# Patient Record
Sex: MALE | Race: WHITE | NOT HISPANIC OR LATINO | Employment: FULL TIME | URBAN - METROPOLITAN AREA
[De-identification: names, ages, dates, MRNs, and addresses within clinical notes are randomized per-mention and may not be internally consistent; named-entity substitution may affect disease eponyms.]

---

## 2017-01-16 RX ORDER — ATENOLOL 50 MG/1
TABLET ORAL
Qty: 90 TABLET | Refills: 0 | Status: SHIPPED | OUTPATIENT
Start: 2017-01-16 | End: 2017-03-03

## 2017-03-03 ENCOUNTER — OFFICE VISIT (OUTPATIENT)
Dept: INTERNAL MEDICINE | Facility: CLINIC | Age: 27
End: 2017-03-03

## 2017-03-03 VITALS
HEIGHT: 70 IN | BODY MASS INDEX: 38.34 KG/M2 | DIASTOLIC BLOOD PRESSURE: 76 MMHG | HEART RATE: 76 BPM | WEIGHT: 267.8 LBS | SYSTOLIC BLOOD PRESSURE: 114 MMHG | OXYGEN SATURATION: 98 % | TEMPERATURE: 98 F | RESPIRATION RATE: 16 BRPM

## 2017-03-03 DIAGNOSIS — Z00.00 ENCOUNTER FOR PREVENTIVE HEALTH EXAMINATION: Primary | ICD-10-CM

## 2017-03-03 PROCEDURE — 99395 PREV VISIT EST AGE 18-39: CPT | Performed by: INTERNAL MEDICINE

## 2017-03-03 NOTE — PROGRESS NOTES
Subjective   Primitivo Reeves is a 26 y.o. male.     Chief Complaint   Patient presents with   • Hypertension   • ADD   • Anxiety         HPI Comments: In for annual preventative exam.  Sleeps 7-8 hours per night.  Sleep is been improving.  Exercises one day per week.  Energy is satisfactory.  Diet is balanced.    Hypertension   This is a chronic problem. The current episode started more than 1 year ago. The problem is unchanged. The problem is controlled. Pertinent negatives include no chest pain, headaches, palpitations, peripheral edema or shortness of breath. Risk factors for coronary artery disease include male gender and smoking/tobacco exposure. Past treatments include beta blockers. The current treatment provides significant improvement. There are no compliance problems.  There is no history of angina, kidney disease, CAD/MI, CVA or heart failure.        The following portions of the patient's history were reviewed and updated as appropriate: allergies, current medications, past social history and problem list.    HISTORY  Outpatient Prescriptions Marked as Taking for the 3/3/17 encounter (Office Visit) with Chicho Mckeon MD   Medication Sig Dispense Refill   • atenolol (TENORMIN) 50 MG tablet TAKE 1 TABLET BY MOUTH DAILY 90 tablet 0     Social History     Social History   • Marital status:      Spouse name: N/A   • Number of children: N/A   • Years of education: N/A     Occupational History   • Not on file.     Social History Main Topics   • Smoking status: Former Smoker     Quit date: 2014   • Smokeless tobacco: Not on file   • Alcohol use 9.0 oz/week     15 Cans of beer per week   • Drug use: Not on file   • Sexual activity: Not on file     Other Topics Concern   • Not on file     Social History Narrative     Family History   Problem Relation Age of Onset   • No Known Problems Mother    • No Known Problems Brother    • No Known Problems Son      Past Medical History   Diagnosis Date   • ADD  (attention deficit disorder)    • Anxiety, generalized    • AR (allergic rhinitis)    • GERD (gastroesophageal reflux disease)    • Hypertension      Past Surgical History   Procedure Laterality Date   • Pilonidal cystectomy         Review of Systems   Constitutional: Negative for chills, fatigue and fever.   HENT: Negative for congestion, ear pain, nosebleeds, rhinorrhea, sore throat and voice change.    Eyes: Negative for discharge, itching and visual disturbance.   Respiratory: Negative for cough, chest tightness, shortness of breath and wheezing.    Cardiovascular: Negative for chest pain, palpitations and leg swelling.   Gastrointestinal: Negative for abdominal pain, anal bleeding, constipation, diarrhea, nausea and vomiting.   Endocrine: Negative for cold intolerance, heat intolerance and polyuria.   Genitourinary: Negative for difficulty urinating, dysuria, frequency, hematuria and urgency.   Musculoskeletal: Negative for arthralgias, back pain and myalgias.   Skin: Negative for rash and wound.   Allergic/Immunologic: Negative for environmental allergies.   Neurological: Negative for dizziness, syncope, weakness, light-headedness and headaches.   Hematological: Negative for adenopathy. Does not bruise/bleed easily.   Psychiatric/Behavioral: Negative for confusion and sleep disturbance. The patient is nervous/anxious.        Objective   Vitals:    03/03/17 1411   BP: 114/76   Pulse: 76   Resp: 16   Temp: 98 °F (36.7 °C)   SpO2: 98%      Last Weight    03/03/17  1411   Weight: 267 lb 12.8 oz (121 kg)    [unfilled]  Body mass index is 38.43 kg/(m^2).      Physical Exam   Constitutional: He is oriented to person, place, and time. He appears well-developed and well-nourished.   HENT:   Head: Normocephalic and atraumatic.   Right Ear: External ear normal.   Left Ear: External ear normal.   Nose: Nose normal.   Mouth/Throat: Oropharynx is clear and moist.   Eyes: Conjunctivae and EOM are normal. Pupils are  equal, round, and reactive to light. No scleral icterus.   Neck: Normal range of motion. Neck supple. No JVD present. No thyromegaly present.   Cardiovascular: Normal rate, regular rhythm, normal heart sounds and intact distal pulses.  Exam reveals no gallop and no friction rub.    No murmur heard.  Pulmonary/Chest: Effort normal and breath sounds normal. No respiratory distress. He has no wheezes. He has no rales.   Abdominal: Soft. Bowel sounds are normal. He exhibits no distension and no mass. There is no tenderness. There is no rebound and no guarding. No hernia.   Musculoskeletal: Normal range of motion. He exhibits no edema.   Lymphadenopathy:     He has no cervical adenopathy.   Neurological: He is alert and oriented to person, place, and time. He has normal reflexes. He displays normal reflexes.   Skin: Skin is warm and dry.   Psychiatric: He has a normal mood and affect. His behavior is normal. Judgment and thought content normal.   Nursing note and vitals reviewed.        Problem List Items Addressed This Visit     None      Visit Diagnoses     Encounter for preventive health examination    -  Primary        Assessment/Plan   In for annual preventative exam.  Blood pressure is doing great.  Well-controlled.  He is off of ADD medicine.  He's had some significant anxiety and panic in the past.  That's doing better.  He had his annual lab work in August.  We'll continue to monitor blood pressure at 6 month intervals.  He is otherwise up-to-date and doing well.  He could lose some weight.  Could stand exercise more.         Dragon disclaimer:   Much of this encounter note is an electronic transcription/translation of spoken language to printed text. The electronic translation of spoken language may permit erroneous, or at times, nonsensical words or phrases to be inadvertently transcribed; Although I have reviewed the note for such errors, some may still exist.

## 2017-04-14 RX ORDER — ATENOLOL 50 MG/1
TABLET ORAL
Qty: 90 TABLET | Refills: 1 | Status: SHIPPED | OUTPATIENT
Start: 2017-04-14 | End: 2017-09-29 | Stop reason: ALTCHOICE

## 2017-09-14 ENCOUNTER — TELEPHONE (OUTPATIENT)
Dept: INTERNAL MEDICINE | Facility: CLINIC | Age: 27
End: 2017-09-14

## 2017-09-14 RX ORDER — METOPROLOL SUCCINATE 50 MG/1
50 TABLET, EXTENDED RELEASE ORAL DAILY
Qty: 90 TABLET | Refills: 1 | Status: SHIPPED | OUTPATIENT
Start: 2017-09-14 | End: 2017-09-29 | Stop reason: SDUPTHER

## 2017-09-29 ENCOUNTER — TELEPHONE (OUTPATIENT)
Dept: INTERNAL MEDICINE | Facility: CLINIC | Age: 27
End: 2017-09-29

## 2017-09-29 RX ORDER — METOPROLOL SUCCINATE 50 MG/1
50 TABLET, EXTENDED RELEASE ORAL DAILY
Qty: 30 TABLET | Refills: 2 | Status: SHIPPED | OUTPATIENT
Start: 2017-09-29 | End: 2017-09-29 | Stop reason: SDUPTHER

## 2017-09-29 RX ORDER — METOPROLOL SUCCINATE 50 MG/1
50 TABLET, EXTENDED RELEASE ORAL DAILY
Qty: 30 TABLET | Refills: 2 | Status: SHIPPED | OUTPATIENT
Start: 2017-09-29 | End: 2018-03-27 | Stop reason: ALTCHOICE

## 2017-09-29 NOTE — TELEPHONE ENCOUNTER
This is another patient who is on Atenolol. It is still on back order and unavailable to patients. What would be comparable? Please advise.     Switch him to metoprolol succinate 50 mg once daily.  #30.  2 refills.

## 2018-01-02 ENCOUNTER — OFFICE VISIT (OUTPATIENT)
Dept: INTERNAL MEDICINE | Facility: CLINIC | Age: 28
End: 2018-01-02

## 2018-01-02 VITALS
TEMPERATURE: 98.6 F | WEIGHT: 272 LBS | HEART RATE: 112 BPM | DIASTOLIC BLOOD PRESSURE: 98 MMHG | BODY MASS INDEX: 38.94 KG/M2 | HEIGHT: 70 IN | SYSTOLIC BLOOD PRESSURE: 150 MMHG | RESPIRATION RATE: 16 BRPM | OXYGEN SATURATION: 96 %

## 2018-01-02 DIAGNOSIS — J40 BRONCHITIS: Primary | ICD-10-CM

## 2018-01-02 PROCEDURE — 99213 OFFICE O/P EST LOW 20 MIN: CPT | Performed by: INTERNAL MEDICINE

## 2018-01-02 RX ORDER — BENZONATATE 200 MG/1
200 CAPSULE ORAL 3 TIMES DAILY PRN
Qty: 30 CAPSULE | Refills: 0 | Status: SHIPPED | OUTPATIENT
Start: 2018-01-02 | End: 2018-03-27

## 2018-01-02 RX ORDER — AMOXICILLIN AND CLAVULANATE POTASSIUM 875; 125 MG/1; MG/1
1 TABLET, FILM COATED ORAL EVERY 12 HOURS SCHEDULED
Qty: 20 TABLET | Refills: 0 | Status: SHIPPED | OUTPATIENT
Start: 2018-01-02 | End: 2018-03-27

## 2018-01-02 NOTE — PROGRESS NOTES
Subjective   Primitivo Reeves is a 27 y.o. male.     Chief Complaint   Patient presents with   • Cough     w phlemb x 6 wks   • Nasal Congestion     x 4-5 days         Cough   This is a new problem. The current episode started 1 to 4 weeks ago. The problem has been unchanged. The problem occurs constantly. The cough is productive of sputum. Associated symptoms include postnasal drip. Pertinent negatives include no chills, fever, nasal congestion, rhinorrhea, sore throat or shortness of breath. Nothing aggravates the symptoms. He has tried OTC cough suppressant for the symptoms. The treatment provided mild relief. There is no history of asthma, COPD or emphysema.        The following portions of the patient's history were reviewed and updated as appropriate: allergies, current medications, past social history and problem list.    Outpatient Prescriptions Marked as Taking for the 1/2/18 encounter (Office Visit) with Chicho Mckeon MD   Medication Sig Dispense Refill   • Dextromethorphan-Guaifenesin (DELSYM [facility]/CHEST REINA DM CHILD) 5-100 MG/5ML liquid Take  by mouth.     • metoprolol succinate XL (TOPROL XL) 50 MG 24 hr tablet Take 1 tablet by mouth Daily. 30 tablet 2       Review of Systems   Constitutional: Negative for chills and fever.   HENT: Positive for postnasal drip. Negative for rhinorrhea and sore throat.    Respiratory: Positive for cough. Negative for shortness of breath.        Objective   Vitals:    01/02/18 1457   BP: 150/98   Pulse: 112   Resp: 16   Temp: 98.6 °F (37 °C)   SpO2: 96%      Last Weight    01/02/18  1457   Weight: 123 kg (272 lb)    [unfilled]  Body mass index is 39.03 kg/(m^2).      Physical Exam   Constitutional: He appears well-developed and well-nourished.   HENT:   Head: Normocephalic and atraumatic.   Right Ear: External ear normal.   Left Ear: External ear normal.   Nose: Nose normal.   Mouth/Throat: Oropharynx is clear and moist.   Eyes: Conjunctivae are normal. Pupils are equal,  round, and reactive to light.   Pulmonary/Chest: Effort normal and breath sounds normal. No respiratory distress. He has no wheezes. He has no rales.   Skin: Skin is warm and dry.         Problem List Items Addressed This Visit     None      Visit Diagnoses     Bronchitis    -  Primary    Relevant Medications    Dextromethorphan-Guaifenesin (DELSYM [facility]/CHEST REINA DM CHILD) 5-100 MG/5ML liquid        Assessment/Plan   Prolonged cough.  He's been coughing for one month.  Bringing up a little sputum.  Not too much.  Not much else in the way of symptoms.  He is not clearing up.  He's tried over-the-counter cough syrup with only modest success.  We'll add some Augmentin 875 twice a day for 10 days.  Tessalon cough perles when necessary.         Dragon disclaimer:   Much of this encounter note is an electronic transcription/translation of spoken language to printed text. The electronic translation of spoken language may permit erroneous, or at times, nonsensical words or phrases to be inadvertently transcribed; Although I have reviewed the note for such errors, some may still exist.

## 2018-01-08 NOTE — TELEPHONE ENCOUNTER
----- Message from Suzanne Savage MA sent at 1/8/2018 11:15 AM EST -----  Regarding: FW: Prescription Question  Contact: [phone]      ----- Message -----     From: Primitivo Reeves     Sent: 1/8/2018  11:10 AM       To: Y[facility] [place]  Subject: RE: Prescription Question                        I am still taking that     ----- Message -----  From: AICHA MAYERS  Sent: 1/8/18, 10:42 AM  To: Primitivo Reeves  Subject: RE: Prescription Question    Primitivo,  I will get a message back to Dr. Bella regarding the cough medicine. Are you still taking your antibiotic as well?     ----- Message -----     From: Primitivo Reeves     Sent: 1/8/2018 10:41 AM EST       To: Chicho Bella MD  Subject: Prescription Question    Hello, I was wondering if maybe dr bella could give me a stronger cough medicine per my recent visit. The benzonatate isnt helping any more than otc delsym. Thanks!      Okay to start on some H and B1 to 1 when necessary.  1-2 teaspoons every 4 hours when necessary cough.  Did not take the Tessalon cough perles while he is taking the cough syrup.

## 2018-01-18 ENCOUNTER — TELEPHONE (OUTPATIENT)
Dept: INTERNAL MEDICINE | Facility: CLINIC | Age: 28
End: 2018-01-18

## 2018-01-18 NOTE — TELEPHONE ENCOUNTER
Patient was in the office 1-2-18 with bronchitis. He says his cough is still lingering around. He also woke up this morning with a lot of pain in his back. He thinks he may have pulled a muscle from coughing. Please advise.     Try on a Medrol Dosepak.  It may help with the cough and the back pain.

## 2018-01-25 RX ORDER — ATENOLOL 50 MG/1
TABLET ORAL
Qty: 90 TABLET | Refills: 0 | Status: SHIPPED | OUTPATIENT
Start: 2018-01-25 | End: 2018-03-23 | Stop reason: SDUPTHER

## 2018-03-23 RX ORDER — ATENOLOL 50 MG/1
50 TABLET ORAL DAILY
Qty: 90 TABLET | Refills: 0 | Status: SHIPPED | OUTPATIENT
Start: 2018-03-23 | End: 2018-04-05 | Stop reason: SDUPTHER

## 2018-03-27 ENCOUNTER — OFFICE VISIT (OUTPATIENT)
Dept: INTERNAL MEDICINE | Facility: CLINIC | Age: 28
End: 2018-03-27

## 2018-03-27 VITALS
HEIGHT: 70 IN | HEART RATE: 70 BPM | RESPIRATION RATE: 16 BRPM | SYSTOLIC BLOOD PRESSURE: 130 MMHG | TEMPERATURE: 98.5 F | OXYGEN SATURATION: 98 % | BODY MASS INDEX: 40.2 KG/M2 | WEIGHT: 280.8 LBS | DIASTOLIC BLOOD PRESSURE: 92 MMHG

## 2018-03-27 DIAGNOSIS — Z00.00 ENCOUNTER FOR ANNUAL HEALTH EXAMINATION: Primary | ICD-10-CM

## 2018-03-27 DIAGNOSIS — I10 ESSENTIAL HYPERTENSION: ICD-10-CM

## 2018-03-27 LAB
ALBUMIN SERPL-MCNC: 4.7 G/DL (ref 3.5–5.2)
ALBUMIN/GLOB SERPL: 1.6 G/DL
ALP SERPL-CCNC: 68 U/L (ref 39–117)
ALT SERPL-CCNC: 32 U/L (ref 1–41)
AST SERPL-CCNC: 22 U/L (ref 1–40)
BASOPHILS # BLD AUTO: 0.03 10*3/MM3 (ref 0–0.2)
BASOPHILS NFR BLD AUTO: 0.3 % (ref 0–1.5)
BILIRUB BLD-MCNC: ABNORMAL MG/DL
BILIRUB SERPL-MCNC: 0.7 MG/DL (ref 0.1–1.2)
BUN SERPL-MCNC: 12 MG/DL (ref 6–20)
BUN/CREAT SERPL: 13.6 (ref 7–25)
CALCIUM SERPL-MCNC: 10 MG/DL (ref 8.6–10.5)
CHLORIDE SERPL-SCNC: 96 MMOL/L (ref 98–107)
CHOLEST SERPL-MCNC: 200 MG/DL (ref 0–200)
CLARITY, POC: CLEAR
CO2 SERPL-SCNC: 24.1 MMOL/L (ref 22–29)
COLOR UR: ABNORMAL
CREAT SERPL-MCNC: 0.88 MG/DL (ref 0.76–1.27)
EOSINOPHIL # BLD AUTO: 0.1 10*3/MM3 (ref 0–0.7)
EOSINOPHIL NFR BLD AUTO: 1.1 % (ref 0.3–6.2)
ERYTHROCYTE [DISTWIDTH] IN BLOOD BY AUTOMATED COUNT: 12.9 % (ref 11.5–14.5)
GFR SERPLBLD CREATININE-BSD FMLA CKD-EPI: 102 ML/MIN/1.73
GFR SERPLBLD CREATININE-BSD FMLA CKD-EPI: 124 ML/MIN/1.73
GLOBULIN SER CALC-MCNC: 2.9 GM/DL
GLUCOSE SERPL-MCNC: 87 MG/DL (ref 65–99)
GLUCOSE UR STRIP-MCNC: NEGATIVE MG/DL
HCT VFR BLD AUTO: 46 % (ref 40.4–52.2)
HDLC SERPL-MCNC: 46 MG/DL (ref 40–60)
HGB BLD-MCNC: 15.6 G/DL (ref 13.7–17.6)
IMM GRANULOCYTES # BLD: 0.05 10*3/MM3 (ref 0–0.03)
IMM GRANULOCYTES NFR BLD: 0.6 % (ref 0–0.5)
KETONES UR QL: ABNORMAL
LDLC SERPL CALC-MCNC: 112 MG/DL (ref 0–100)
LEUKOCYTE EST, POC: NEGATIVE
LYMPHOCYTES # BLD AUTO: 3.23 10*3/MM3 (ref 0.9–4.8)
LYMPHOCYTES NFR BLD AUTO: 36.5 % (ref 19.6–45.3)
MCH RBC QN AUTO: 32.3 PG (ref 27–32.7)
MCHC RBC AUTO-ENTMCNC: 33.9 G/DL (ref 32.6–36.4)
MCV RBC AUTO: 95.2 FL (ref 79.8–96.2)
MONOCYTES # BLD AUTO: 0.8 10*3/MM3 (ref 0.2–1.2)
MONOCYTES NFR BLD AUTO: 9 % (ref 5–12)
NEUTROPHILS # BLD AUTO: 4.64 10*3/MM3 (ref 1.9–8.1)
NEUTROPHILS NFR BLD AUTO: 52.5 % (ref 42.7–76)
NITRITE UR-MCNC: NEGATIVE MG/ML
PH UR: 6.5 [PH] (ref 5–8)
PLATELET # BLD AUTO: 280 10*3/MM3 (ref 140–500)
POTASSIUM SERPL-SCNC: 4.2 MMOL/L (ref 3.5–5.2)
PROT SERPL-MCNC: 7.6 G/DL (ref 6–8.5)
PROT UR STRIP-MCNC: ABNORMAL MG/DL
RBC # BLD AUTO: 4.83 10*6/MM3 (ref 4.6–6)
RBC # UR STRIP: NEGATIVE /UL
SODIUM SERPL-SCNC: 137 MMOL/L (ref 136–145)
SP GR UR: 1.02 (ref 1–1.03)
TRIGL SERPL-MCNC: 208 MG/DL (ref 0–150)
UROBILINOGEN UR QL: ABNORMAL
VLDLC SERPL CALC-MCNC: 41.6 MG/DL (ref 5–40)
WBC # BLD AUTO: 8.85 10*3/MM3 (ref 4.5–10.7)

## 2018-03-27 PROCEDURE — 81003 URINALYSIS AUTO W/O SCOPE: CPT | Performed by: INTERNAL MEDICINE

## 2018-03-27 PROCEDURE — 99395 PREV VISIT EST AGE 18-39: CPT | Performed by: INTERNAL MEDICINE

## 2018-03-27 NOTE — PROGRESS NOTES
Subjective   Primitivo Reeves is a 27 y.o. male.     Chief Complaint   Patient presents with   • Annual Exam         In for annual preventative exam.  Sleeps 6-8 hours per night.  Sleep is been improving.  Exercises 3-4 days per week.  Energy is satisfactory.  Diet is balanced.         The following portions of the patient's history were reviewed and updated as appropriate: allergies, current medications, past social history and problem list.    HISTORY  Outpatient Prescriptions Marked as Taking for the 3/27/18 encounter (Office Visit) with Chicho Mckeon MD   Medication Sig Dispense Refill   • atenolol (TENORMIN) 50 MG tablet TAKE 1 TABLET BY MOUTH DAILY 90 tablet 0     Social History     Social History   • Marital status:      Spouse name: N/A   • Number of children: N/A   • Years of education: N/A     Occupational History   • Not on file.     Social History Main Topics   • Smoking status: Former Smoker     Packs/day: 1.00     Years: 3.00     Types: Cigarettes     Start date: 2011     Quit date: 2014   • Smokeless tobacco: Current User     Types: Chew     Last attempt to quit: 2017   • Alcohol use 9.0 oz/week     15 Cans of beer per week   • Drug use: Unknown   • Sexual activity: Not on file     Other Topics Concern   • Not on file     Social History Narrative   • No narrative on file     Family History   Problem Relation Age of Onset   • No Known Problems Mother    • No Known Problems Brother    • No Known Problems Son      Past Medical History:   Diagnosis Date   • ADD (attention deficit disorder)    • Anxiety, generalized    • AR (allergic rhinitis)    • GERD (gastroesophageal reflux disease)    • Hypertension      Past Surgical History:   Procedure Laterality Date   • PILONIDAL CYSTECTOMY         Review of Systems   Constitutional: Negative for chills, fatigue and fever.   HENT: Negative for congestion, ear pain, nosebleeds, rhinorrhea, sore throat and voice change.    Eyes: Negative for discharge,  itching and visual disturbance.   Respiratory: Negative for cough, chest tightness and wheezing.    Cardiovascular: Negative for leg swelling.   Gastrointestinal: Negative for abdominal pain, anal bleeding, constipation, diarrhea, nausea and vomiting.   Endocrine: Negative for cold intolerance, heat intolerance and polyuria.   Genitourinary: Negative for difficulty urinating, dysuria, frequency, hematuria and urgency.   Musculoskeletal: Negative for arthralgias, back pain and myalgias.   Skin: Negative for rash and wound.   Allergic/Immunologic: Positive for environmental allergies.   Neurological: Negative for dizziness, syncope, weakness and light-headedness.   Hematological: Negative for adenopathy. Does not bruise/bleed easily.   Psychiatric/Behavioral: Negative for confusion and sleep disturbance. The patient is nervous/anxious.        Objective   Vitals:    03/27/18 1346   BP: 130/92   Pulse: 70   Resp: 16   Temp: 98.5 °F (36.9 °C)   SpO2: 98%      1    03/27/18  1346   Weight: 127 kg (280 lb 12.8 oz)    [unfilled]  Body mass index is 40.29 kg/m².      Physical Exam   Constitutional: He is oriented to person, place, and time. He appears well-developed and well-nourished.   HENT:   Head: Normocephalic and atraumatic.   Right Ear: External ear normal.   Left Ear: External ear normal.   Nose: Nose normal.   Mouth/Throat: Oropharynx is clear and moist.   Eyes: Conjunctivae and EOM are normal. Pupils are equal, round, and reactive to light. No scleral icterus.   Neck: Normal range of motion. Neck supple. No JVD present. No thyromegaly present.   Cardiovascular: Normal rate, regular rhythm, normal heart sounds and intact distal pulses.  Exam reveals no gallop and no friction rub.    No murmur heard.  Pulmonary/Chest: Effort normal and breath sounds normal. No respiratory distress. He has no wheezes. He has no rales.   Abdominal: Soft. Bowel sounds are normal. He exhibits no distension and no mass. There is no  tenderness. There is no rebound and no guarding. No hernia.   Musculoskeletal: Normal range of motion. He exhibits no edema.   Lymphadenopathy:     He has no cervical adenopathy.   Neurological: He is alert and oriented to person, place, and time. He has normal reflexes. He displays normal reflexes.   Skin: Skin is warm and dry.   Psychiatric: He has a normal mood and affect. His behavior is normal. Judgment and thought content normal.   Nursing note and vitals reviewed.        Problem List Items Addressed This Visit     None      Visit Diagnoses     Encounter for annual health examination    -  Primary    Relevant Orders    POCT urinalysis dipstick, automated (Completed)        Assessment/Plan   In for annual preventative exam.  Blood pressure is up today.  Previously well-controlled.  He is off of ADD medicine.  He's had some significant anxiety and panic in the past.  That's doing better.  He will get his annual lab work today, 3/2018.  We'll continue to monitor blood pressure at 6 month intervals.  He is otherwise up-to-date and doing well.  He could lose some weight.  Could stand exercise more.         Dragon disclaimer:   Much of this encounter note is an electronic transcription/translation of spoken language to printed text. The electronic translation of spoken language may permit erroneous, or at times, nonsensical words or phrases to be inadvertently transcribed; Although I have reviewed the note for such errors, some may still exist.

## 2018-04-05 RX ORDER — ATENOLOL 50 MG/1
50 TABLET ORAL DAILY
Qty: 90 TABLET | Refills: 1 | Status: SHIPPED | OUTPATIENT
Start: 2018-04-05 | End: 2018-09-28 | Stop reason: SDUPTHER

## 2018-07-06 RX ORDER — ATENOLOL 50 MG/1
50 TABLET ORAL DAILY
Qty: 90 TABLET | Refills: 0 | Status: SHIPPED | OUTPATIENT
Start: 2018-07-06 | End: 2019-05-08 | Stop reason: SDUPTHER

## 2018-09-28 ENCOUNTER — OFFICE VISIT (OUTPATIENT)
Dept: INTERNAL MEDICINE | Facility: CLINIC | Age: 28
End: 2018-09-28

## 2018-09-28 VITALS
TEMPERATURE: 98.9 F | DIASTOLIC BLOOD PRESSURE: 84 MMHG | OXYGEN SATURATION: 96 % | HEIGHT: 70 IN | WEIGHT: 256.8 LBS | RESPIRATION RATE: 16 BRPM | SYSTOLIC BLOOD PRESSURE: 148 MMHG | BODY MASS INDEX: 36.76 KG/M2 | HEART RATE: 66 BPM

## 2018-09-28 DIAGNOSIS — F41.9 ANXIETY: ICD-10-CM

## 2018-09-28 DIAGNOSIS — I10 ESSENTIAL HYPERTENSION: Primary | ICD-10-CM

## 2018-09-28 DIAGNOSIS — K21.9 GASTROESOPHAGEAL REFLUX DISEASE WITHOUT ESOPHAGITIS: ICD-10-CM

## 2018-09-28 PROCEDURE — 99213 OFFICE O/P EST LOW 20 MIN: CPT | Performed by: INTERNAL MEDICINE

## 2018-09-28 RX ORDER — OLMESARTAN MEDOXOMIL 20 MG/1
10 TABLET ORAL DAILY
Qty: 45 TABLET | Refills: 1 | Status: SHIPPED | OUTPATIENT
Start: 2018-09-28 | End: 2019-03-24 | Stop reason: SDUPTHER

## 2018-09-28 NOTE — PATIENT INSTRUCTIONS
Exercising to Stay Healthy  Exercising regularly is important. It has many health benefits, such as:  · Improving your overall fitness, flexibility, and endurance.  · Increasing your bone density.  · Helping with weight control.  · Decreasing your body fat.  · Increasing your muscle strength.  · Reducing stress and tension.  · Improving your overall health.    In order to become healthy and stay healthy, it is recommended that you do moderate-intensity and vigorous-intensity exercise. You can tell that you are exercising at a moderate intensity if you have a higher heart rate and faster breathing, but you are still able to hold a conversation. You can tell that you are exercising at a vigorous intensity if you are breathing much harder and faster and cannot hold a conversation while exercising.  How often should I exercise?  Choose an activity that you enjoy and set realistic goals. Your health care provider can help you to make an activity plan that works for you. Exercise regularly as directed by your health care provider. This may include:  · Doing resistance training twice each week, such as:  ? Push-ups.  ? Sit-ups.  ? Lifting weights.  ? Using resistance bands.  · Doing a given intensity of exercise for a given amount of time. Choose from these options:  ? 150 minutes of moderate-intensity exercise every week.  ? 75 minutes of vigorous-intensity exercise every week.  ? A mix of moderate-intensity and vigorous-intensity exercise every week.    Children, pregnant women, people who are out of shape, people who are overweight, and older adults may need to consult a health care provider for individual recommendations. If you have any sort of medical condition, be sure to consult your health care provider before starting a new exercise program.  What are some exercise ideas?  Some moderate-intensity exercise ideas include:  · Walking at a rate of 1 mile in 15  minutes.  · Biking.  · Hiking.  · Golfing.  · Dancing.    Some vigorous-intensity exercise ideas include:  · Walking at a rate of at least 4.5 miles per hour.  · Jogging or running at a rate of 5 miles per hour.  · Biking at a rate of at least 10 miles per hour.  · Lap swimming.  · Roller-skating or in-line skating.  · Cross-country skiing.  · Vigorous competitive sports, such as football, basketball, and soccer.  · Jumping rope.  · Aerobic dancing.    What are some everyday activities that can help me to get exercise?  · Yard work, such as:  ? Pushing a .  ? Raking and bagging leaves.  · Washing and waxing your car.  · Pushing a stroller.  · Shoveling snow.  · Gardening.  · Washing windows or floors.  How can I be more active in my day-to-day activities?  · Use the stairs instead of the elevator.  · Take a walk during your lunch break.  · If you drive, park your car farther away from work or school.  · If you take public transportation, get off one stop early and walk the rest of the way.  · Make all of your phone calls while standing up and walking around.  · Get up, stretch, and walk around every 30 minutes throughout the day.  What guidelines should I follow while exercising?  · Do not exercise so much that you hurt yourself, feel dizzy, or get very short of breath.  · Consult your health care provider before starting a new exercise program.  · Wear comfortable clothes and shoes with good support.  · Drink plenty of water while you exercise to prevent dehydration or heat stroke. Body water is lost during exercise and must be replaced.  · Work out until you breathe faster and your heart beats faster.  This information is not intended to replace advice given to you by your health care provider. Make sure you discuss any questions you have with your health care provider.  Document Released: 01/20/2012 Document Revised: 05/25/2017 Document Reviewed: 05/21/2015  [employer] Interactive Patient Education © 2018  [employer]  Calorie Counting for Weight Loss  Calories are units of energy. Your body needs a certain amount of calories from food to keep you going throughout the day. When you eat more calories than your body needs, your body stores the extra calories as fat. When you eat fewer calories than your body needs, your body burns fat to get the energy it needs.  Calorie counting means keeping track of how many calories you eat and drink each day. Calorie counting can be helpful if you need to lose weight. If you make sure to eat fewer calories than your body needs, you should lose weight. Ask your health care provider what a healthy weight is for you.  For calorie counting to work, you will need to eat the right number of calories in a day in order to lose a healthy amount of weight per week. A dietitian can help you determine how many calories you need in a day and will give you suggestions on how to reach your calorie goal.  · A healthy amount of weight to lose per week is usually 1-2 lb (0.5-0.9 kg). This usually means that your daily calorie intake should be reduced by 500-750 calories.  · Eating 1,200 - 1,500 calories per day can help most women lose weight.  · Eating 1,500 - 1,800 calories per day can help most men lose weight.    What do I need to know about calorie counting?  In order to meet your daily calorie goal, you will need to:  · Find out how many calories are in each food you would like to eat. Try to do this before you eat.  · Decide how much of the food you plan to eat.  · Write down what you ate and how many calories it had. Doing this is called keeping a food log.    To successfully lose weight, it is important to balance calorie counting with a healthy lifestyle that includes regular activity. Aim for 150 minutes of moderate exercise (such as walking) or 75 minutes of vigorous exercise (such as running) each week.  Where do I find calorie information?    The number of calories in a food can be  found on a Nutrition Facts label. If a food does not have a Nutrition Facts label, try to look up the calories online or ask your dietitian for help.  Remember that calories are listed per serving. If you choose to have more than one serving of a food, you will have to multiply the calories per serving by the amount of servings you plan to eat. For example, the label on a package of bread might say that a serving size is 1 slice and that there are 90 calories in a serving. If you eat 1 slice, you will have eaten 90 calories. If you eat 2 slices, you will have eaten 180 calories.  How do I keep a food log?  Immediately after each meal, record the following information in your food log:  · What you ate. Don't forget to include toppings, sauces, and other extras on the food.  · How much you ate. This can be measured in cups, ounces, or number of items.  · How many calories each food and drink had.  · The total number of calories in the meal.    Keep your food log near you, such as in a small notebook in your pocket, or use a mobile mary ellen or website. Some programs will calculate calories for you and show you how many calories you have left for the day to meet your goal.  What are some calorie counting tips?  · Use your calories on foods and drinks that will fill you up and not leave you hungry:  ? Some examples of foods that fill you up are nuts and nut butters, vegetables, lean proteins, and high-fiber foods like whole grains. High-fiber foods are foods with more than 5 g fiber per serving.  ? Drinks such as sodas, specialty coffee drinks, alcohol, and juices have a lot of calories, yet do not fill you up.  · Eat nutritious foods and avoid empty calories. Empty calories are calories you get from foods or beverages that do not have many vitamins or protein, such as candy, sweets, and soda. It is better to have a nutritious high-calorie food (such as an avocado) than a food with few nutrients (such as a bag of  "chips).  · Know how many calories are in the foods you eat most often. This will help you calculate calorie counts faster.  · Pay attention to calories in drinks. Low-calorie drinks include water and unsweetened drinks.  · Pay attention to nutrition labels for \"low fat\" or \"fat free\" foods. These foods sometimes have the same amount of calories or more calories than the full fat versions. They also often have added sugar, starch, or salt, to make up for flavor that was removed with the fat.  · Find a way of tracking calories that works for you. Get creative. Try different apps or programs if writing down calories does not work for you.  What are some portion control tips?  · Know how many calories are in a serving. This will help you know how many servings of a certain food you can have.  · Use a measuring cup to measure serving sizes. You could also try weighing out portions on a kitchen scale. With time, you will be able to estimate serving sizes for some foods.  · Take some time to put servings of different foods on your favorite plates, bowls, and cups so you know what a serving looks like.  · Try not to eat straight from a bag or box. Doing this can lead to overeating. Put the amount you would like to eat in a cup or on a plate to make sure you are eating the right portion.  · Use smaller plates, glasses, and bowls to prevent overeating.  · Try not to multitask (for example, watch TV or use your computer) while eating. If it is time to eat, sit down at a table and enjoy your food. This will help you to know when you are full. It will also help you to be aware of what you are eating and how much you are eating.  What are tips for following this plan?  Reading food labels  · Check the calorie count compared to the serving size. The serving size may be smaller than what you are used to eating.  · Check the source of the calories. Make sure the food you are eating is high in vitamins and protein and low in " saturated and trans fats.  Shopping  · Read nutrition labels while you shop. This will help you make healthy decisions before you decide to purchase your food.  · Make a grocery list and stick to it.  Cooking  · Try to cook your favorite foods in a healthier way. For example, try baking instead of frying.  · Use low-fat dairy products.  Meal planning  · Use more fruits and vegetables. Half of your plate should be fruits and vegetables.  · Include lean proteins like poultry and fish.  How do I count calories when eating out?  · Ask for smaller portion sizes.  · Consider sharing an entree and sides instead of getting your own entree.  · If you get your own entree, eat only half. Ask for a box at the beginning of your meal and put the rest of your entree in it so you are not tempted to eat it.  · If calories are listed on the menu, choose the lower calorie options.  · Choose dishes that include vegetables, fruits, whole grains, low-fat dairy products, and lean protein.  · Choose items that are boiled, broiled, grilled, or steamed. Stay away from items that are buttered, battered, fried, or served with cream sauce. Items labeled “crispy” are usually fried, unless stated otherwise.  · Choose water, low-fat milk, unsweetened iced tea, or other drinks without added sugar. If you want an alcoholic beverage, choose a lower calorie option such as a glass of wine or light beer.  · Ask for dressings, sauces, and syrups on the side. These are usually high in calories, so you should limit the amount you eat.  · If you want a salad, choose a garden salad and ask for grilled meats. Avoid extra toppings like tiwari, cheese, or fried items. Ask for the dressing on the side, or ask for olive oil and vinegar or lemon to use as dressing.  · Estimate how many servings of a food you are given. For example, a serving of cooked rice is ½ cup or about the size of half a baseball. Knowing serving sizes will help you be aware of how much food  you are eating at restaurants. The list below tells you how big or small some common portion sizes are based on everyday objects:  ? 1 oz--4 stacked dice.  ? 3 oz--1 deck of cards.  ? 1 tsp--1 die.  ? 1 Tbsp--½ a ping-pong ball.  ? 2 Tbsp--1 ping-pong ball.  ? ½ cup--½ baseball.  ? 1 cup--1 baseball.  Summary  · Calorie counting means keeping track of how many calories you eat and drink each day. If you eat fewer calories than your body needs, you should lose weight.  · A healthy amount of weight to lose per week is usually 1-2 lb (0.5-0.9 kg). This usually means reducing your daily calorie intake by 500-750 calories.  · The number of calories in a food can be found on a Nutrition Facts label. If a food does not have a Nutrition Facts label, try to look up the calories online or ask your dietitian for help.  · Use your calories on foods and drinks that will fill you up, and not on foods and drinks that will leave you hungry.  · Use smaller plates, glasses, and bowls to prevent overeating.  This information is not intended to replace advice given to you by your health care provider. Make sure you discuss any questions you have with your health care provider.  Document Released: 12/18/2006 Document Revised: 11/17/2017 Document Reviewed: 11/17/2017  [employer] Interactive Patient Education © 2018 [employer]  BMI for Adults  Body mass index (BMI) is a number that is calculated from a person's weight and height. In most adults, the number is used to find how much of an adult's weight is made up of fat. BMI is not as accurate as a direct measure of body fat.  How is BMI calculated?  BMI is calculated by dividing weight in kilograms by height in meters squared. It can also be calculated by dividing weight in pounds by height in inches squared, then multiplying the resulting number by 703. Charts are available to help you find your BMI quickly and easily without doing this calculation.  How is BMI interpreted?  Health care  professionals use BMI charts to identify whether an adult is underweight, at a normal weight, or overweight based on the following guidelines:  · Underweight: BMI less than 18.5.  · Normal weight: BMI between 18.5 and 24.9.  · Overweight: BMI between 25 and 29.9.  · Obese: BMI of 30 and above.    BMI is usually interpreted the same for males and females.  Weight includes both fat and muscle, so someone with a muscular build, such as an athlete, may have a BMI that is higher than 24.9. In cases like these, BMI may not accurately depict body fat. To determine if excess body fat is the cause of a BMI of 25 or higher, further assessments may need to be done by a health care provider.  Why is BMI a useful tool?  BMI is used to identify a possible weight problem that may be related to a medical problem or may increase the risk for medical problems. BMI can also be used to promote changes to reach a healthy weight.  This information is not intended to replace advice given to you by your health care provider. Make sure you discuss any questions you have with your health care provider.  Document Released: 08/29/2005 Document Revised: 04/27/2017 Document Reviewed: 05/15/2015  [employer] Interactive Patient Education © 2018 [employer]

## 2018-09-28 NOTE — PROGRESS NOTES
Subjective   Primitivo Reeves is a 28 y.o. male.     Chief Complaint   Patient presents with   • Hypertension         Hypertension   This is a chronic problem. The current episode started more than 1 year ago. The problem is unchanged. Associated symptoms include anxiety. Pertinent negatives include no chest pain, palpitations, peripheral edema or shortness of breath. There are no associated agents to hypertension. Risk factors for coronary artery disease include male gender. Current antihypertension treatment includes beta blockers. The current treatment provides no improvement. There is no history of angina, kidney disease, CAD/MI, CVA or heart failure.        The following portions of the patient's history were reviewed and updated as appropriate: allergies, current medications, past social history and problem list.    Outpatient Prescriptions Marked as Taking for the 9/28/18 encounter (Office Visit) with Chicho Mckeon MD   Medication Sig Dispense Refill   • atenolol (TENORMIN) 50 MG tablet TAKE 1 TABLET BY MOUTH DAILY 90 tablet 0       Review of Systems   Constitutional: Negative for chills and fever.   Respiratory: Negative for shortness of breath.    Cardiovascular: Negative for chest pain and palpitations.   Gastrointestinal: Negative for abdominal pain.       Objective   Vitals:    09/28/18 1049   BP: 148/84   Pulse: 66   Resp: 16   Temp: 98.9 °F (37.2 °C)   SpO2: 96%      1    09/28/18  1049   Weight: 116 kg (256 lb 12.8 oz)    [unfilled]  Body mass index is 36.85 kg/m².      Physical Exam   Constitutional: He appears well-developed and well-nourished. No distress.   HENT:   Head: Normocephalic and atraumatic.   Neck: Carotid bruit is not present.   Cardiovascular: Normal rate, regular rhythm, normal heart sounds and intact distal pulses.  Exam reveals no gallop.    No murmur heard.  Pulmonary/Chest: Effort normal and breath sounds normal. No respiratory distress. He has no wheezes. He has no rales.    Abdominal: Soft. Bowel sounds are normal. He exhibits no mass. There is no tenderness. There is no guarding.         Problem List Items Addressed This Visit        Cardiovascular and Mediastinum    Essential hypertension - Primary       Digestive    Gastroesophageal reflux disease without esophagitis       Other    Anxiety        Assessment/Plan   In for recheck of hypertension and GERD.  No GERD since cutting back on alcohol.  Blood pressure is up today.  Previously well-controlled.  He is off of ADD medicine.  He's had some significant anxiety and panic in the past.  That's doing better.  He will got his annual lab work 3/2018.  We'll continue to monitor blood pressure at 6 month intervals.  Will add Benicar 10 mg daily today for his hypertension.  Needs to lose weight.  Has already lost 24 pounds in the last 6 months.         .bmifollowup  Dragon disclaimer:   Much of this encounter note is an electronic transcription/translation of spoken language to printed text. The electronic translation of spoken language may permit erroneous, or at times, nonsensical words or phrases to be inadvertently transcribed; Although I have reviewed the note for such errors, some may still exist.

## 2018-11-04 ENCOUNTER — HOSPITAL ENCOUNTER (EMERGENCY)
Facility: HOSPITAL | Age: 28
Discharge: LEFT BEFORE DISCHARGE | End: 2018-11-04
Attending: EMERGENCY MEDICINE | Admitting: EMERGENCY MEDICINE

## 2018-11-04 VITALS
HEIGHT: 70 IN | WEIGHT: 249.8 LBS | TEMPERATURE: 97.6 F | OXYGEN SATURATION: 97 % | RESPIRATION RATE: 18 BRPM | BODY MASS INDEX: 35.76 KG/M2 | SYSTOLIC BLOOD PRESSURE: 154 MMHG | HEART RATE: 106 BPM | DIASTOLIC BLOOD PRESSURE: 104 MMHG

## 2018-11-04 DIAGNOSIS — F32.A DEPRESSION, UNSPECIFIED DEPRESSION TYPE: Primary | ICD-10-CM

## 2018-11-04 PROCEDURE — 99283 EMERGENCY DEPT VISIT LOW MDM: CPT

## 2018-11-05 NOTE — ED PROVIDER NOTES
EMERGENCY DEPARTMENT ENCOUNTER    CHIEF COMPLAINT  Chief Complaint: Anxiety  History given by: Patient  History limited by: None  Room Number: 11/11  PMD: Chicho Mckeon MD      HPI:  Pt is a 28 y.o. male who presents complaining of anxiety and depression which has been ongoing for 10 years. Pt denies having ever been on medication for depression, and is not currently seeing a therapist. Pt also notes hx of  hypertension but he denies SI, HI, and plan to hurt himself. Pt reports that he drinks on a daily basis and has drank 6 beers today.     Duration:  10 years  Onset: Gradual  Timing: Constant  Location: N/A  Radiation: N/A  Intensity/Severity: Moderate  Progression: Unchanged  Associated Symptoms: Depression  Aggravating Factors: None specified  Alleviating Factors: None specified  Previous Episodes: Pt has hx of depression, anxiety, and hypertension   Treatment before arrival: Pt denies having ever been on medication for depression, and is not currently seeing a therapist.     PAST MEDICAL HISTORY  Active Ambulatory Problems     Diagnosis Date Noted   • ADD (attention deficit disorder) 09/02/2016   • Gastroesophageal reflux disease without esophagitis 09/02/2016   • Seasonal allergies 09/02/2016   • Essential hypertension 09/02/2016   • Anxiety 09/02/2016     Resolved Ambulatory Problems     Diagnosis Date Noted   • No Resolved Ambulatory Problems     Past Medical History:   Diagnosis Date   • ADD (attention deficit disorder)    • Anxiety, generalized    • AR (allergic rhinitis)    • GERD (gastroesophageal reflux disease)    • Hypertension        PAST SURGICAL HISTORY  Past Surgical History:   Procedure Laterality Date   • PILONIDAL CYSTECTOMY         FAMILY HISTORY  Family History   Problem Relation Age of Onset   • No Known Problems Mother    • No Known Problems Brother    • No Known Problems Son        SOCIAL HISTORY  Social History     Social History   • Marital status:      Spouse name: N/A   •  Number of children: N/A   • Years of education: N/A     Occupational History   • Not on file.     Social History Main Topics   • Smoking status: Former Smoker     Packs/day: 1.00     Years: 3.00     Types: Cigarettes     Start date: 2011     Quit date: 2014   • Smokeless tobacco: Current User     Types: Chew     Last attempt to quit: 2017   • Alcohol use 9.0 oz/week     15 Cans of beer per week   • Drug use: Unknown   • Sexual activity: Not on file     Other Topics Concern   • Not on file     Social History Narrative   • No narrative on file       ALLERGIES  Patient has no known allergies.    REVIEW OF SYSTEMS  Review of Systems   Constitutional: Negative for fever.   Respiratory: Negative for shortness of breath.    Cardiovascular: Negative for chest pain.   Psychiatric/Behavioral: Positive for dysphoric mood. Negative for suicidal ideas. The patient is nervous/anxious.        PHYSICAL EXAM  ED Triage Vitals   Temp Heart Rate Resp BP SpO2   11/04/18 2307 11/04/18 2307 11/04/18 2307 11/04/18 2311 11/04/18 2307   97.6 °F (36.4 °C) 112 20 (!) 154/104 91 %      Temp src Heart Rate Source Patient Position BP Location FiO2 (%)   11/04/18 2307 11/04/18 2307 11/04/18 2311 11/04/18 2311 --   Tympanic Monitor Lying Right arm        Physical Exam   Constitutional: No distress.   HENT:   Head: Normocephalic and atraumatic.   Eyes: EOM are normal.   Neck: Normal range of motion.   Pulmonary/Chest: No respiratory distress.   Abdominal: There is no tenderness.   Musculoskeletal: He exhibits no edema.   Neurological: He is alert.   Skin: Skin is warm and dry.   Psychiatric: He expresses no suicidal ideation.   Distant affect   Nursing note and vitals reviewed.    PROCEDURES  Procedures    PROGRESS AND CONSULTS   2318-Checked patient and discussed plan to order EtOH, urine drug screen, and Access evaluation. I discussed that it may take up to a few hours to see Access evaluator. Pt understands and agrees with the plan, all  questions answered.    2321-Ordered EtOH, urine drug screen, and Access evaluation.     2329-Patient requests to leave without further treatment at this time.    MEDICAL DECISION MAKING  Results were reviewed/discussed with the patient and they were also made aware of online access. Pt also made aware that some labs, such as cultures, will not be resulted during ER visit and follow up with PMD is necessary.     MDM  Number of Diagnoses or Management Options  Depression, unspecified depression type:   Patient Progress  Patient progress: stable         DIAGNOSIS  Final diagnoses:   Depression, unspecified depression type     DISPOSITION  Patient was seen by a healthcare provider and left AMA. Pt was counseled on risks of leaving without further care and verbalized understanding of risks.     Latest Documented Vital Signs:  As of 11:34 PM  BP- (!) 154/104 HR- 106 Temp- 97.6 °F (36.4 °C) (Oral) O2 sat- 97%    --  Documentation assistance provided by vicki Mancia for Dr. Burrows.  Information recorded by the scribe was done at my direction and has been verified and validated by me.       Mis Mancia  11/04/18 [id number]       Dewey Burrows MD  11/05/18 [id number]

## 2018-11-05 NOTE — ED TRIAGE NOTES
"Pt is very anxious and irritable. He arrived with his mother from home with complaints of wanting a mental health evaluation. Mother states his wife called her this am and said he was having a bad day. He told her that he \"doesn't want to live\". He is denying SI at this time. Pt has had issues with trying to stop drinking on his own. His mother states he goes for about 2-3 weeks then starts back again.  "

## 2019-03-18 ENCOUNTER — TELEPHONE (OUTPATIENT)
Dept: INTERNAL MEDICINE | Facility: CLINIC | Age: 29
End: 2019-03-18

## 2019-03-25 RX ORDER — OLMESARTAN MEDOXOMIL 20 MG/1
TABLET ORAL
Qty: 45 TABLET | Refills: 0 | Status: SHIPPED | OUTPATIENT
Start: 2019-03-25 | End: 2019-06-16 | Stop reason: SDUPTHER

## 2019-04-04 ENCOUNTER — OFFICE VISIT (OUTPATIENT)
Dept: INTERNAL MEDICINE | Facility: CLINIC | Age: 29
End: 2019-04-04

## 2019-04-04 VITALS
BODY MASS INDEX: 38.08 KG/M2 | HEART RATE: 64 BPM | TEMPERATURE: 98.8 F | OXYGEN SATURATION: 99 % | DIASTOLIC BLOOD PRESSURE: 84 MMHG | SYSTOLIC BLOOD PRESSURE: 122 MMHG | WEIGHT: 266 LBS | HEIGHT: 70 IN | RESPIRATION RATE: 16 BRPM

## 2019-04-04 DIAGNOSIS — F98.8 ATTENTION DEFICIT DISORDER (ADD) WITHOUT HYPERACTIVITY: ICD-10-CM

## 2019-04-04 DIAGNOSIS — K21.9 GASTROESOPHAGEAL REFLUX DISEASE WITHOUT ESOPHAGITIS: ICD-10-CM

## 2019-04-04 DIAGNOSIS — I10 ESSENTIAL HYPERTENSION: ICD-10-CM

## 2019-04-04 DIAGNOSIS — Z00.00 ENCOUNTER FOR HEALTH MAINTENANCE EXAMINATION: Primary | ICD-10-CM

## 2019-04-04 DIAGNOSIS — F41.9 ANXIETY: ICD-10-CM

## 2019-04-04 LAB
ALBUMIN SERPL-MCNC: 5.2 G/DL (ref 3.5–5.2)
ALBUMIN/GLOB SERPL: 1.9 G/DL
ALP SERPL-CCNC: 56 U/L (ref 39–117)
ALT SERPL-CCNC: 29 U/L (ref 1–41)
AST SERPL-CCNC: 16 U/L (ref 1–40)
BASOPHILS # BLD AUTO: 0.04 10*3/MM3 (ref 0–0.2)
BASOPHILS NFR BLD AUTO: 0.5 % (ref 0–1.5)
BILIRUB BLD-MCNC: NEGATIVE MG/DL
BILIRUB SERPL-MCNC: 0.5 MG/DL (ref 0.2–1.2)
BUN SERPL-MCNC: 12 MG/DL (ref 6–20)
BUN/CREAT SERPL: 13.2 (ref 7–25)
CALCIUM SERPL-MCNC: 10 MG/DL (ref 8.6–10.5)
CHLORIDE SERPL-SCNC: 102 MMOL/L (ref 98–107)
CHOLEST SERPL-MCNC: 187 MG/DL (ref 0–200)
CLARITY, POC: CLEAR
CO2 SERPL-SCNC: 25.1 MMOL/L (ref 22–29)
COLOR UR: YELLOW
CREAT SERPL-MCNC: 0.91 MG/DL (ref 0.76–1.27)
EOSINOPHIL # BLD AUTO: 0.18 10*3/MM3 (ref 0–0.4)
EOSINOPHIL NFR BLD AUTO: 2.4 % (ref 0.3–6.2)
ERYTHROCYTE [DISTWIDTH] IN BLOOD BY AUTOMATED COUNT: 11.9 % (ref 12.3–15.4)
GLOBULIN SER CALC-MCNC: 2.7 GM/DL
GLUCOSE SERPL-MCNC: 88 MG/DL (ref 65–99)
GLUCOSE UR STRIP-MCNC: NEGATIVE MG/DL
HCT VFR BLD AUTO: 47.3 % (ref 37.5–51)
HDLC SERPL-MCNC: 45 MG/DL (ref 40–60)
HGB BLD-MCNC: 15.7 G/DL (ref 13–17.7)
IMM GRANULOCYTES # BLD AUTO: 0.03 10*3/MM3 (ref 0–0.05)
IMM GRANULOCYTES NFR BLD AUTO: 0.4 % (ref 0–0.5)
KETONES UR QL: NEGATIVE
LDLC SERPL CALC-MCNC: 125 MG/DL (ref 0–100)
LEUKOCYTE EST, POC: NEGATIVE
LYMPHOCYTES # BLD AUTO: 2.62 10*3/MM3 (ref 0.7–3.1)
LYMPHOCYTES NFR BLD AUTO: 34.7 % (ref 19.6–45.3)
MCH RBC QN AUTO: 30.7 PG (ref 26.6–33)
MCHC RBC AUTO-ENTMCNC: 33.2 G/DL (ref 31.5–35.7)
MCV RBC AUTO: 92.4 FL (ref 79–97)
MONOCYTES # BLD AUTO: 0.9 10*3/MM3 (ref 0.1–0.9)
MONOCYTES NFR BLD AUTO: 11.9 % (ref 5–12)
NEUTROPHILS # BLD AUTO: 3.79 10*3/MM3 (ref 1.4–7)
NEUTROPHILS NFR BLD AUTO: 50.1 % (ref 42.7–76)
NITRITE UR-MCNC: NEGATIVE MG/ML
NRBC BLD AUTO-RTO: 0 /100 WBC (ref 0–0)
PH UR: 7 [PH] (ref 5–8)
PLATELET # BLD AUTO: 252 10*3/MM3 (ref 140–450)
POTASSIUM SERPL-SCNC: 4.4 MMOL/L (ref 3.5–5.2)
PROT SERPL-MCNC: 7.9 G/DL (ref 6–8.5)
PROT UR STRIP-MCNC: NEGATIVE MG/DL
RBC # BLD AUTO: 5.12 10*6/MM3 (ref 4.14–5.8)
RBC # UR STRIP: NEGATIVE /UL
SODIUM SERPL-SCNC: 138 MMOL/L (ref 136–145)
SP GR UR: 1.01 (ref 1–1.03)
TRIGL SERPL-MCNC: 87 MG/DL (ref 0–150)
UROBILINOGEN UR QL: NORMAL
VLDLC SERPL CALC-MCNC: 17.4 MG/DL
WBC # BLD AUTO: 7.56 10*3/MM3 (ref 3.4–10.8)

## 2019-04-04 PROCEDURE — 99395 PREV VISIT EST AGE 18-39: CPT | Performed by: INTERNAL MEDICINE

## 2019-04-04 PROCEDURE — 81003 URINALYSIS AUTO W/O SCOPE: CPT | Performed by: INTERNAL MEDICINE

## 2019-04-04 NOTE — PATIENT INSTRUCTIONS
Exercising to Stay Healthy  Exercising regularly is important. It has many health benefits, such as:  · Improving your overall fitness, flexibility, and endurance.  · Increasing your bone density.  · Helping with weight control.  · Decreasing your body fat.  · Increasing your muscle strength.  · Reducing stress and tension.  · Improving your overall health.    In order to become healthy and stay healthy, it is recommended that you do moderate-intensity and vigorous-intensity exercise. You can tell that you are exercising at a moderate intensity if you have a higher heart rate and faster breathing, but you are still able to hold a conversation. You can tell that you are exercising at a vigorous intensity if you are breathing much harder and faster and cannot hold a conversation while exercising.  How often should I exercise?  Choose an activity that you enjoy and set realistic goals. Your health care provider can help you to make an activity plan that works for you. Exercise regularly as directed by your health care provider. This may include:  · Doing resistance training twice each week, such as:  ? Push-ups.  ? Sit-ups.  ? Lifting weights.  ? Using resistance bands.  · Doing a given intensity of exercise for a given amount of time. Choose from these options:  ? 150 minutes of moderate-intensity exercise every week.  ? 75 minutes of vigorous-intensity exercise every week.  ? A mix of moderate-intensity and vigorous-intensity exercise every week.    Children, pregnant women, people who are out of shape, people who are overweight, and older adults may need to consult a health care provider for individual recommendations. If you have any sort of medical condition, be sure to consult your health care provider before starting a new exercise program.  What are some exercise ideas?  Some moderate-intensity exercise ideas include:  · Walking at a rate of 1 mile in 15  minutes.  · Biking.  · Hiking.  · Golfing.  · Dancing.    Some vigorous-intensity exercise ideas include:  · Walking at a rate of at least 4.5 miles per hour.  · Jogging or running at a rate of 5 miles per hour.  · Biking at a rate of at least 10 miles per hour.  · Lap swimming.  · Roller-skating or in-line skating.  · Cross-country skiing.  · Vigorous competitive sports, such as football, basketball, and soccer.  · Jumping rope.  · Aerobic dancing.    What are some everyday activities that can help me to get exercise?  · Yard work, such as:  ? Pushing a .  ? Raking and bagging leaves.  · Washing and waxing your car.  · Pushing a stroller.  · Shoveling snow.  · Gardening.  · Washing windows or floors.  How can I be more active in my day-to-day activities?  · Use the stairs instead of the elevator.  · Take a walk during your lunch break.  · If you drive, park your car farther away from work or school.  · If you take public transportation, get off one stop early and walk the rest of the way.  · Make all of your phone calls while standing up and walking around.  · Get up, stretch, and walk around every 30 minutes throughout the day.  What guidelines should I follow while exercising?  · Do not exercise so much that you hurt yourself, feel dizzy, or get very short of breath.  · Consult your health care provider before starting a new exercise program.  · Wear comfortable clothes and shoes with good support.  · Drink plenty of water while you exercise to prevent dehydration or heat stroke. Body water is lost during exercise and must be replaced.  · Work out until you breathe faster and your heart beats faster.  This information is not intended to replace advice given to you by your health care provider. Make sure you discuss any questions you have with your health care provider.  Document Released: 01/20/2012 Document Revised: 05/25/2017 Document Reviewed: 05/21/2015  [employer] Interactive Patient Education © 2018  [employer]  Calorie Counting for Weight Loss  Calories are units of energy. Your body needs a certain amount of calories from food to keep you going throughout the day. When you eat more calories than your body needs, your body stores the extra calories as fat. When you eat fewer calories than your body needs, your body burns fat to get the energy it needs.  Calorie counting means keeping track of how many calories you eat and drink each day. Calorie counting can be helpful if you need to lose weight. If you make sure to eat fewer calories than your body needs, you should lose weight. Ask your health care provider what a healthy weight is for you.  For calorie counting to work, you will need to eat the right number of calories in a day in order to lose a healthy amount of weight per week. A dietitian can help you determine how many calories you need in a day and will give you suggestions on how to reach your calorie goal.  · A healthy amount of weight to lose per week is usually 1-2 lb (0.5-0.9 kg). This usually means that your daily calorie intake should be reduced by 500-750 calories.  · Eating 1,200 - 1,500 calories per day can help most women lose weight.  · Eating 1,500 - 1,800 calories per day can help most men lose weight.    What is my plan?  My goal is to have __________ calories per day.  If I have this many calories per day, I should lose around __________ pounds per week.  What do I need to know about calorie counting?  In order to meet your daily calorie goal, you will need to:  · Find out how many calories are in each food you would like to eat. Try to do this before you eat.  · Decide how much of the food you plan to eat.  · Write down what you ate and how many calories it had. Doing this is called keeping a food log.    To successfully lose weight, it is important to balance calorie counting with a healthy lifestyle that includes regular activity. Aim for 150 minutes of moderate exercise (such as  walking) or 75 minutes of vigorous exercise (such as running) each week.  Where do I find calorie information?    The number of calories in a food can be found on a Nutrition Facts label. If a food does not have a Nutrition Facts label, try to look up the calories online or ask your dietitian for help.  Remember that calories are listed per serving. If you choose to have more than one serving of a food, you will have to multiply the calories per serving by the amount of servings you plan to eat. For example, the label on a package of bread might say that a serving size is 1 slice and that there are 90 calories in a serving. If you eat 1 slice, you will have eaten 90 calories. If you eat 2 slices, you will have eaten 180 calories.  How do I keep a food log?  Immediately after each meal, record the following information in your food log:  · What you ate. Don't forget to include toppings, sauces, and other extras on the food.  · How much you ate. This can be measured in cups, ounces, or number of items.  · How many calories each food and drink had.  · The total number of calories in the meal.    Keep your food log near you, such as in a small notebook in your pocket, or use a mobile mary ellen or website. Some programs will calculate calories for you and show you how many calories you have left for the day to meet your goal.  What are some calorie counting tips?  · Use your calories on foods and drinks that will fill you up and not leave you hungry:  ? Some examples of foods that fill you up are nuts and nut butters, vegetables, lean proteins, and high-fiber foods like whole grains. High-fiber foods are foods with more than 5 g fiber per serving.  ? Drinks such as sodas, specialty coffee drinks, alcohol, and juices have a lot of calories, yet do not fill you up.  · Eat nutritious foods and avoid empty calories. Empty calories are calories you get from foods or beverages that do not have many vitamins or protein, such as  "candy, sweets, and soda. It is better to have a nutritious high-calorie food (such as an avocado) than a food with few nutrients (such as a bag of chips).  · Know how many calories are in the foods you eat most often. This will help you calculate calorie counts faster.  · Pay attention to calories in drinks. Low-calorie drinks include water and unsweetened drinks.  · Pay attention to nutrition labels for \"low fat\" or \"fat free\" foods. These foods sometimes have the same amount of calories or more calories than the full fat versions. They also often have added sugar, starch, or salt, to make up for flavor that was removed with the fat.  · Find a way of tracking calories that works for you. Get creative. Try different apps or programs if writing down calories does not work for you.  What are some portion control tips?  · Know how many calories are in a serving. This will help you know how many servings of a certain food you can have.  · Use a measuring cup to measure serving sizes. You could also try weighing out portions on a kitchen scale. With time, you will be able to estimate serving sizes for some foods.  · Take some time to put servings of different foods on your favorite plates, bowls, and cups so you know what a serving looks like.  · Try not to eat straight from a bag or box. Doing this can lead to overeating. Put the amount you would like to eat in a cup or on a plate to make sure you are eating the right portion.  · Use smaller plates, glasses, and bowls to prevent overeating.  · Try not to multitask (for example, watch TV or use your computer) while eating. If it is time to eat, sit down at a table and enjoy your food. This will help you to know when you are full. It will also help you to be aware of what you are eating and how much you are eating.  What are tips for following this plan?  Reading food labels  · Check the calorie count compared to the serving size. The serving size may be smaller than what " you are used to eating.  · Check the source of the calories. Make sure the food you are eating is high in vitamins and protein and low in saturated and trans fats.  Shopping  · Read nutrition labels while you shop. This will help you make healthy decisions before you decide to purchase your food.  · Make a grocery list and stick to it.  Cooking  · Try to cook your favorite foods in a healthier way. For example, try baking instead of frying.  · Use low-fat dairy products.  Meal planning  · Use more fruits and vegetables. Half of your plate should be fruits and vegetables.  · Include lean proteins like poultry and fish.  How do I count calories when eating out?  · Ask for smaller portion sizes.  · Consider sharing an entree and sides instead of getting your own entree.  · If you get your own entree, eat only half. Ask for a box at the beginning of your meal and put the rest of your entree in it so you are not tempted to eat it.  · If calories are listed on the menu, choose the lower calorie options.  · Choose dishes that include vegetables, fruits, whole grains, low-fat dairy products, and lean protein.  · Choose items that are boiled, broiled, grilled, or steamed. Stay away from items that are buttered, battered, fried, or served with cream sauce. Items labeled “crispy” are usually fried, unless stated otherwise.  · Choose water, low-fat milk, unsweetened iced tea, or other drinks without added sugar. If you want an alcoholic beverage, choose a lower calorie option such as a glass of wine or light beer.  · Ask for dressings, sauces, and syrups on the side. These are usually high in calories, so you should limit the amount you eat.  · If you want a salad, choose a garden salad and ask for grilled meats. Avoid extra toppings like tiwari, cheese, or fried items. Ask for the dressing on the side, or ask for olive oil and vinegar or lemon to use as dressing.  · Estimate how many servings of a food you are given. For  example, a serving of cooked rice is ½ cup or about the size of half a baseball. Knowing serving sizes will help you be aware of how much food you are eating at restaurants. The list below tells you how big or small some common portion sizes are based on everyday objects:  ? 1 oz--4 stacked dice.  ? 3 oz--1 deck of cards.  ? 1 tsp--1 die.  ? 1 Tbsp--½ a ping-pong ball.  ? 2 Tbsp--1 ping-pong ball.  ? ½ cup--½ baseball.  ? 1 cup--1 baseball.  Summary  · Calorie counting means keeping track of how many calories you eat and drink each day. If you eat fewer calories than your body needs, you should lose weight.  · A healthy amount of weight to lose per week is usually 1-2 lb (0.5-0.9 kg). This usually means reducing your daily calorie intake by 500-750 calories.  · The number of calories in a food can be found on a Nutrition Facts label. If a food does not have a Nutrition Facts label, try to look up the calories online or ask your dietitian for help.  · Use your calories on foods and drinks that will fill you up, and not on foods and drinks that will leave you hungry.  · Use smaller plates, glasses, and bowls to prevent overeating.  This information is not intended to replace advice given to you by your health care provider. Make sure you discuss any questions you have with your health care provider.  Document Released: 12/18/2006 Document Revised: 11/17/2017 Document Reviewed: 11/17/2017  [employer] Interactive Patient Education © 2019 [employer]  BMI for Adults  Body mass index (BMI) is a number that is calculated from a person's weight and height. In most adults, the number is used to find how much of an adult's weight is made up of fat. BMI is not as accurate as a direct measure of body fat.  How is BMI calculated?  BMI is calculated by dividing weight in kilograms by height in meters squared. It can also be calculated by dividing weight in pounds by height in inches squared, then multiplying the resulting number  by 703. Charts are available to help you find your BMI quickly and easily without doing this calculation.  How is BMI interpreted?  Health care professionals use BMI charts to identify whether an adult is underweight, at a normal weight, or overweight based on the following guidelines:  · Underweight: BMI less than 18.5.  · Normal weight: BMI between 18.5 and 24.9.  · Overweight: BMI between 25 and 29.9.  · Obese: BMI of 30 and above.    BMI is usually interpreted the same for males and females.  Weight includes both fat and muscle, so someone with a muscular build, such as an athlete, may have a BMI that is higher than 24.9. In cases like these, BMI may not accurately depict body fat. To determine if excess body fat is the cause of a BMI of 25 or higher, further assessments may need to be done by a health care provider.  Why is BMI a useful tool?  BMI is used to identify a possible weight problem that may be related to a medical problem or may increase the risk for medical problems. BMI can also be used to promote changes to reach a healthy weight.  This information is not intended to replace advice given to you by your health care provider. Make sure you discuss any questions you have with your health care provider.  Document Released: 08/29/2005 Document Revised: 04/27/2017 Document Reviewed: 05/15/2015  [employer] Interactive Patient Education © 2018 [employer]

## 2019-04-04 NOTE — PROGRESS NOTES
Subjective   Primitivo Reeves is a 28 y.o. male.     Chief Complaint   Patient presents with   • Annual Exam         In for annual preventative exam.  Sleeps 7-8 hours per night.  Sleep is good.  Exercises 3-4 days per week.  Energy is satisfactory.  Diet is balanced.         The following portions of the patient's history were reviewed and updated as appropriate: allergies, current medications, past social history and problem list.    HISTORY  Outpatient Medications Marked as Taking for the 19 encounter (Office Visit) with Chicho Mckeon MD   Medication Sig Dispense Refill   • atenolol (TENORMIN) 50 MG tablet TAKE 1 TABLET BY MOUTH DAILY 90 tablet 0   • olmesartan (BENICAR) 20 MG tablet TAKE 1/2 TABLET BY MOUTH DAILY 45 tablet 0     Social History     Socioeconomic History   • Marital status:      Spouse name: Not on file   • Number of children: Not on file   • Years of education: Not on file   • Highest education level: Not on file   Tobacco Use   • Smoking status: Former Smoker     Packs/day: 1.00     Years: 3.00     Pack years: 3.00     Types: Cigarettes     Start date:      Last attempt to quit:      Years since quittin.2   • Smokeless tobacco: Current User     Types: Chew     Last attempt to quit: 2017   Substance and Sexual Activity   • Alcohol use: Yes     Alcohol/week: 9.0 oz     Types: 15 Cans of beer per week     Family History   Problem Relation Age of Onset   • No Known Problems Mother    • No Known Problems Brother    • No Known Problems Son      Past Medical History:   Diagnosis Date   • ADD (attention deficit disorder)    • Anxiety, generalized    • AR (allergic rhinitis)    • GERD (gastroesophageal reflux disease)    • Hypertension      Past Surgical History:   Procedure Laterality Date   • PILONIDAL CYSTECTOMY         Review of Systems   Constitutional: Negative for chills, fatigue and fever.   HENT: Negative for congestion, ear pain, nosebleeds, rhinorrhea, sore throat and  voice change.    Eyes: Negative for discharge, itching and visual disturbance.   Respiratory: Negative for cough, chest tightness and wheezing.    Cardiovascular: Negative for leg swelling.   Gastrointestinal: Negative for abdominal pain, anal bleeding, constipation, diarrhea, nausea and vomiting.   Endocrine: Negative for cold intolerance, heat intolerance and polyuria.   Genitourinary: Negative for difficulty urinating, dysuria, frequency, hematuria and urgency.   Musculoskeletal: Negative for arthralgias, back pain and myalgias.   Skin: Negative for rash and wound.   Allergic/Immunologic: Positive for environmental allergies.   Neurological: Negative for dizziness, syncope, weakness and light-headedness.   Hematological: Negative for adenopathy. Does not bruise/bleed easily.   Psychiatric/Behavioral: Negative for confusion and sleep disturbance. The patient is nervous/anxious.        Objective   Vitals:    04/04/19 1452   BP: 122/84   Pulse: 64   Resp: 16   Temp: 98.8 °F (37.1 °C)   SpO2: 99%          04/04/19  1452   Weight: 121 kg (266 lb)    [unfilled]  Body mass index is 38.17 kg/m².      Physical Exam   Constitutional: He is oriented to person, place, and time. He appears well-developed and well-nourished.   HENT:   Head: Normocephalic and atraumatic.   Right Ear: External ear normal.   Left Ear: External ear normal.   Nose: Nose normal.   Mouth/Throat: Oropharynx is clear and moist.   Eyes: Conjunctivae and EOM are normal. Pupils are equal, round, and reactive to light. No scleral icterus.   Neck: Normal range of motion. Neck supple. No JVD present. No thyromegaly present.   Cardiovascular: Normal rate, regular rhythm, normal heart sounds and intact distal pulses. Exam reveals no gallop and no friction rub.   No murmur heard.  Pulmonary/Chest: Effort normal and breath sounds normal. No respiratory distress. He has no wheezes. He has no rales.   Abdominal: Soft. Bowel sounds are normal. He exhibits no  distension and no mass. There is no tenderness. There is no rebound and no guarding. No hernia.   Musculoskeletal: Normal range of motion. He exhibits no edema.   Lymphadenopathy:     He has no cervical adenopathy.   Neurological: He is alert and oriented to person, place, and time. He has normal reflexes. He displays normal reflexes.   Skin: Skin is warm and dry.   Psychiatric: He has a normal mood and affect. His behavior is normal. Judgment and thought content normal.   Nursing note and vitals reviewed.        Problem List Items Addressed This Visit        Cardiovascular and Mediastinum    Essential hypertension       Digestive    Gastroesophageal reflux disease without esophagitis       Other    ADD (attention deficit disorder)    Anxiety      Other Visit Diagnoses     Encounter for health maintenance examination    -  Primary    Relevant Orders    POCT urinalysis dipstick, automated (Completed)        Assessment/Plan   In for annual preventative exam.  Blood pressure is good today.  Previously well-controlled.  He is off of ADD medicine.  He's had some significant anxiety and panic in the past.  That's doing better.  He will get his annual lab work today, 3/2019 including CBC, CMP, lipids, UA.  We'll continue to monitor blood pressure at 6 month intervals.  He is otherwise up-to-date and doing well.  He needs to lose some weight.   He is fasting today.         Dragon disclaimer:   Much of this encounter note is an electronic transcription/translation of spoken language to printed text. The electronic translation of spoken language may permit erroneous, or at times, nonsensical words or phrases to be inadvertently transcribed; Although I have reviewed the note for such errors, some may still exist.

## 2019-05-08 RX ORDER — ATENOLOL 50 MG/1
50 TABLET ORAL DAILY
Qty: 90 TABLET | Refills: 0 | Status: SHIPPED | OUTPATIENT
Start: 2019-05-08 | End: 2019-07-26 | Stop reason: SDUPTHER

## 2019-05-20 ENCOUNTER — TELEPHONE (OUTPATIENT)
Dept: INTERNAL MEDICINE | Facility: CLINIC | Age: 29
End: 2019-05-20

## 2019-05-20 RX ORDER — CLOTRIMAZOLE 10 MG/1
10 LOZENGE ORAL; TOPICAL
Qty: 10 TABLET | Refills: 0 | Status: SHIPPED | OUTPATIENT
Start: 2019-05-20 | End: 2019-10-04

## 2019-05-20 NOTE — TELEPHONE ENCOUNTER
Pt was advised to get treated for thrush since both wife & son were dx w/ thrush from breastfeeding. Pt is asymptomatic. Unsure of name of antifungal that they were prescribed. Please advise.    I really doubt that he is a problem but if he wants to go ahead and treat I would take Mycelex aki suck and swallow 1 4 times daily for 5 days.

## 2019-06-17 RX ORDER — OLMESARTAN MEDOXOMIL 20 MG/1
TABLET ORAL
Qty: 45 TABLET | Refills: 0 | Status: SHIPPED | OUTPATIENT
Start: 2019-06-17 | End: 2019-09-07 | Stop reason: SDUPTHER

## 2019-07-26 RX ORDER — ATENOLOL 50 MG/1
50 TABLET ORAL DAILY
Qty: 90 TABLET | Refills: 0 | Status: SHIPPED | OUTPATIENT
Start: 2019-07-26 | End: 2019-08-25 | Stop reason: SDUPTHER

## 2019-08-26 RX ORDER — ATENOLOL 50 MG/1
50 TABLET ORAL DAILY
Qty: 90 TABLET | Refills: 0 | Status: SHIPPED | OUTPATIENT
Start: 2019-08-26 | End: 2019-11-11 | Stop reason: SDUPTHER

## 2019-09-09 RX ORDER — OLMESARTAN MEDOXOMIL 20 MG/1
TABLET ORAL
Qty: 45 TABLET | Refills: 0 | Status: SHIPPED | OUTPATIENT
Start: 2019-09-09 | End: 2019-11-23 | Stop reason: SDUPTHER

## 2019-10-04 ENCOUNTER — OFFICE VISIT (OUTPATIENT)
Dept: INTERNAL MEDICINE | Facility: CLINIC | Age: 29
End: 2019-10-04

## 2019-10-04 VITALS
RESPIRATION RATE: 16 BRPM | HEIGHT: 70 IN | SYSTOLIC BLOOD PRESSURE: 118 MMHG | DIASTOLIC BLOOD PRESSURE: 72 MMHG | WEIGHT: 247 LBS | HEART RATE: 56 BPM | OXYGEN SATURATION: 97 % | TEMPERATURE: 98.7 F | BODY MASS INDEX: 35.36 KG/M2

## 2019-10-04 DIAGNOSIS — Z23 NEED FOR IMMUNIZATION AGAINST INFLUENZA: Primary | ICD-10-CM

## 2019-10-04 DIAGNOSIS — I10 ESSENTIAL HYPERTENSION: ICD-10-CM

## 2019-10-04 DIAGNOSIS — F98.8 ATTENTION DEFICIT DISORDER (ADD) WITHOUT HYPERACTIVITY: ICD-10-CM

## 2019-10-04 DIAGNOSIS — F41.9 ANXIETY: ICD-10-CM

## 2019-10-04 PROCEDURE — 99213 OFFICE O/P EST LOW 20 MIN: CPT | Performed by: INTERNAL MEDICINE

## 2019-10-04 PROCEDURE — 90674 CCIIV4 VAC NO PRSV 0.5 ML IM: CPT | Performed by: INTERNAL MEDICINE

## 2019-10-04 PROCEDURE — 90471 IMMUNIZATION ADMIN: CPT | Performed by: INTERNAL MEDICINE

## 2019-10-04 NOTE — PROGRESS NOTES
Subjective   Primitivo Reeves is a 29 y.o. male.     Chief Complaint   Patient presents with   • Hypertension         Hypertension   This is a chronic problem. The current episode started more than 1 year ago. The problem is unchanged. Associated symptoms include anxiety. Pertinent negatives include no chest pain, palpitations, peripheral edema or shortness of breath. There are no associated agents to hypertension. Risk factors for coronary artery disease include male gender. Current antihypertension treatment includes beta blockers. The current treatment provides no improvement. There is no history of angina, kidney disease, CAD/MI, CVA or heart failure.   Anxiety   Presents for follow-up visit. Patient reports no chest pain, palpitations or shortness of breath. Symptoms occur occasionally. The severity of symptoms is moderate.            The following portions of the patient's history were reviewed and updated as appropriate: allergies, current medications, past social history and problem list.    Outpatient Medications Marked as Taking for the 10/4/19 encounter (Office Visit) with Chicho Mckeon MD   Medication Sig Dispense Refill   • atenolol (TENORMIN) 50 MG tablet TAKE 1 TABLET BY MOUTH DAILY 90 tablet 0   • olmesartan (BENICAR) 20 MG tablet TAKE 1/2 TABLET BY MOUTH DAILY 45 tablet 0   • [DISCONTINUED] clotrimazole (MYCELEX) 10 MG aki Take 1 tablet by mouth 5 (Five) Times a Day. 10 tablet 0       Review of Systems   Constitutional: Negative for chills and fever.   Respiratory: Negative for shortness of breath.    Cardiovascular: Negative for chest pain and palpitations.   Gastrointestinal: Negative for abdominal pain.       Objective   Vitals:    10/04/19 1506   BP: 118/72   Pulse: 56   Resp: 16   Temp: 98.7 °F (37.1 °C)   SpO2: 97%          10/04/19  1506   Weight: 112 kg (247 lb)    [unfilled]  Body mass index is 35.44 kg/m².      Physical Exam   Constitutional: He appears well-developed and  well-nourished. No distress.   HENT:   Head: Normocephalic and atraumatic.   Neck: Carotid bruit is not present.   Cardiovascular: Normal rate, regular rhythm, normal heart sounds and intact distal pulses. Exam reveals no gallop.   No murmur heard.  Pulmonary/Chest: Effort normal and breath sounds normal. No respiratory distress. He has no wheezes. He has no rales.   Abdominal: Soft. Bowel sounds are normal. He exhibits no mass. There is no tenderness. There is no guarding.         Problem List Items Addressed This Visit        Cardiovascular and Mediastinum    Essential hypertension       Other    ADD (attention deficit disorder)    Anxiety      Other Visit Diagnoses     Need for immunization against influenza    -  Primary    Relevant Orders    Flucelvax Quad=>4Years ([id number]) (Completed)        Assessment/Plan   In for recheck of hypertension and GERD.  No GERD since cutting back on alcohol.  Blood pressure is much better as well.  He is off of ADD medicine.  He's had some significant anxiety and panic in the past.  That's doing better.  He will got his annual lab work 4/2019.  We'll continue to monitor blood pressure at 6 month intervals.  Weight is down 19 pounds in the last 6 months.  He is off of alcohol which is soft a lot of his problems.  Flu shot updated today.         .bmifollowup  Dragon disclaimer:   Much of this encounter note is an electronic transcription/translation of spoken language to printed text. The electronic translation of spoken language may permit erroneous, or at times, nonsensical words or phrases to be inadvertently transcribed; Although I have reviewed the note for such errors, some may still exist.

## 2019-10-04 NOTE — PATIENT INSTRUCTIONS
Influenza Virus Vaccine injection  What is this medicine?  INFLUENZA VIRUS VACCINE (in [facility]) helps to reduce the risk of getting influenza also known as the flu. The vaccine only helps protect you against some strains of the flu.  This medicine may be used for other purposes; ask your health care provider or pharmacist if you have questions.  COMMON BRAND NAME(S): Afluria, Agriflu, Alfuria, FLUAD, Fluarix, Fluarix Quadrivalent, Flublok, Flublok Quadrivalent, FLUCELVAX, Flulaval, Fluvirin, Fluzone, Fluzone High-Dose, Fluzone Intradermal  What should I tell my health care provider before I take this medicine?  They need to know if you have any of these conditions:  -bleeding disorder like hemophilia  -fever or infection  -Guillain-Whitehall syndrome or other neurological problems  -immune system problems  -infection with the human immunodeficiency virus (HIV) or AIDS  -low blood platelet counts  -multiple sclerosis  -an unusual or allergic reaction to influenza virus vaccine, latex, other medicines, foods, dyes, or preservatives. Different brands of vaccines contain different allergens. Some may contain latex or eggs. Talk to your doctor about your allergies to make sure that you get the right vaccine.  -pregnant or trying to get pregnant  -breast-feeding  How should I use this medicine?  This vaccine is for injection into a muscle or under the skin. It is given by a health care professional.  A copy of Vaccine Information Statements will be given before each vaccination. Read this sheet carefully each time. The sheet may change frequently.  Talk to your healthcare provider to see which vaccines are right for you. Some vaccines should not be used in all age groups.  Overdosage: If you think you have taken too much of this medicine contact a poison control center or emergency room at once.  NOTE: This medicine is only for you. Do not share this medicine with others.  What if I miss a dose?  This  does not apply.  What may interact with this medicine?  -chemotherapy or radiation therapy  -medicines that lower your immune system like etanercept, anakinra, infliximab, and adalimumab  -medicines that treat or prevent blood clots like warfarin  -phenytoin  -steroid medicines like prednisone or cortisone  -theophylline  -vaccines  This list may not describe all possible interactions. Give your health care provider a list of all the medicines, herbs, non-prescription drugs, or dietary supplements you use. Also tell them if you smoke, drink alcohol, or use illegal drugs. Some items may interact with your medicine.  What should I watch for while using this medicine?  Report any side effects that do not go away within 3 days to your doctor or health care professional. Call your health care provider if any unusual symptoms occur within 6 weeks of receiving this vaccine.  You may still catch the flu, but the illness is not usually as bad. You cannot get the flu from the vaccine. The vaccine will not protect against colds or other illnesses that may cause fever. The vaccine is needed every year.  What side effects may I notice from receiving this medicine?  Side effects that you should report to your doctor or health care professional as soon as possible:  -allergic reactions like skin rash, itching or hives, swelling of the face, lips, or tongue  Side effects that usually do not require medical attention (report to your doctor or health care professional if they continue or are bothersome):  -fever  -headache  -muscle aches and pains  -pain, tenderness, redness, or swelling at the injection site  -tiredness  This list may not describe all possible side effects. Call your doctor for medical advice about side effects. You may report side effects to FDA at 0-139-FDA-0054.  Where should I keep my medicine?  The vaccine will be given by a health care professional in a clinic, pharmacy, doctor's office, or other health care  setting. You will not be given vaccine doses to store at home.  NOTE: This sheet is a summary. It may not cover all possible information. If you have questions about this medicine, talk to your doctor, pharmacist, or health care provider.  © 2019 [employer]vier/Gold Standard (2016-07-08 10:07:28)

## 2019-11-11 RX ORDER — ATENOLOL 50 MG/1
50 TABLET ORAL DAILY
Qty: 90 TABLET | Refills: 0 | Status: SHIPPED | OUTPATIENT
Start: 2019-11-11 | End: 2020-05-04

## 2019-11-25 RX ORDER — OLMESARTAN MEDOXOMIL 20 MG/1
TABLET ORAL
Qty: 45 TABLET | Refills: 1 | Status: SHIPPED | OUTPATIENT
Start: 2019-11-25 | End: 2020-06-29 | Stop reason: SDUPTHER

## 2020-03-26 ENCOUNTER — TELEMEDICINE (OUTPATIENT)
Dept: INTERNAL MEDICINE | Facility: CLINIC | Age: 30
End: 2020-03-26

## 2020-03-26 DIAGNOSIS — K21.9 GASTROESOPHAGEAL REFLUX DISEASE WITHOUT ESOPHAGITIS: Primary | ICD-10-CM

## 2020-03-26 PROCEDURE — 99213 OFFICE O/P EST LOW 20 MIN: CPT | Performed by: INTERNAL MEDICINE

## 2020-03-26 NOTE — PROGRESS NOTES
Subjective   Primitivo Reeves is a 29 y.o. male.     Chief Complaint   Patient presents with   • Abdominal Pain         Patient had flulike illness 2 weeks ago.  Mainly cough and head congestion.  That is cleared up.  His wife tested positive for influenza at the time.  In the past week or so he has had some early satiety.  A little nausea at times.  No actual heartburn.  He has had a history of GERD in the past.       The following portions of the patient's history were reviewed and updated as appropriate: allergies, current medications, past social history and problem list.    Outpatient Medications Marked as Taking for the 3/26/20 encounter (Telemedicine) with Chicho Mckeon MD   Medication Sig Dispense Refill   • atenolol (TENORMIN) 50 MG tablet Take 1 tablet by mouth Daily. 90 tablet 0   • olmesartan (BENICAR) 20 MG tablet TAKE 1/2 TABLET BY MOUTH DAILY 45 tablet 1       Review of Systems   Constitutional: Negative for chills and fever.   Gastrointestinal: Positive for abdominal distention. Negative for abdominal pain, nausea and vomiting.       Objective   There were no vitals filed for this visit.   Wt Readings from Last 3 Encounters:   10/04/19 112 kg (247 lb)   04/04/19 121 kg (266 lb)   11/04/18 113 kg (249 lb 12.8 oz)    There is no height or weight on file to calculate BMI.      Physical Exam   Constitutional: He appears well-developed and well-nourished.   Skin: Skin is warm and dry.   Psychiatric: He has a normal mood and affect. His behavior is normal.         Problem List Items Addressed This Visit        Digestive    Gastroesophageal reflux disease without esophagitis - Primary        Assessment/Plan   Video visit today.  He has had a week of early satiety.  Feels up easily.  Sometimes just a full sensation in the midepigastrium.  No specific food indiscretions other than increase amount of nuts.  No NSAID use, alcohol, or caffeine.  He does have a history of GERD in the past.  2 weeks ago he had a  flulike illness that is resolved.  Wife tested positive for influenza at the time.  It sounds like he is having GERD again.  Suggested he take some Pepcid 20 mg twice daily or Zantac 150 twice daily.  Absent these he could take Prilosec 20 mg once daily.  To treat for 2 weeks.  Cut out on nuts.  Will revisit if this does not clear them up.  Video visit done today due to the COVID-19 pandemic.             Dragon disclaimer:   Much of this encounter note is an electronic transcription/translation of spoken language to printed text. The electronic translation of spoken language may permit erroneous, or at times, nonsensical words or phrases to be inadvertently transcribed; Although I have reviewed the note for such errors, some may still exist.

## 2020-05-04 RX ORDER — ATENOLOL 50 MG/1
50 TABLET ORAL DAILY
Qty: 90 TABLET | Refills: 0 | Status: SHIPPED | OUTPATIENT
Start: 2020-05-04 | End: 2020-08-17

## 2020-05-07 RX ORDER — OLMESARTAN MEDOXOMIL 20 MG/1
TABLET ORAL
Qty: 45 TABLET | Refills: 1 | OUTPATIENT
Start: 2020-05-07

## 2020-05-08 RX ORDER — ATENOLOL 50 MG/1
50 TABLET ORAL DAILY
Qty: 90 TABLET | Refills: 0 | OUTPATIENT
Start: 2020-05-08

## 2020-06-25 RX ORDER — OLMESARTAN MEDOXOMIL 20 MG/1
10 TABLET ORAL DAILY
Qty: 45 TABLET | Refills: 1 | OUTPATIENT
Start: 2020-06-25

## 2020-06-29 ENCOUNTER — TELEPHONE (OUTPATIENT)
Dept: INTERNAL MEDICINE | Facility: CLINIC | Age: 30
End: 2020-06-29

## 2020-06-29 RX ORDER — OLMESARTAN MEDOXOMIL 20 MG/1
10 TABLET ORAL DAILY
Qty: 45 TABLET | Refills: 1 | Status: SHIPPED | OUTPATIENT
Start: 2020-06-29 | End: 2020-12-21

## 2020-06-29 RX ORDER — OLMESARTAN MEDOXOMIL 20 MG/1
10 TABLET ORAL DAILY
Qty: 45 TABLET | Refills: 1 | OUTPATIENT
Start: 2020-06-29

## 2020-06-29 NOTE — TELEPHONE ENCOUNTER
Patient was denied refill because he is overdue for an appointment. Was seen 3/2020 for a video visit for a sick appointment. He was due 4/2020 for annual. No future appointment has been scheduled. LM for patient to call back.

## 2020-06-29 NOTE — TELEPHONE ENCOUNTER
Patient called in needing refill request for  olmesartan (BENICAR) 20 MG tablet [[id number]]      Patient still uses pharmacy  [id number] - [place], [place] - [facility] [address] [facility] - [phone]  - [phone] FX Phone:  [phone] Fax:  [phone]    Address:  [facility] [address], [facility] [address]            Please call [phone] if any questions

## 2020-08-17 RX ORDER — ATENOLOL 50 MG/1
50 TABLET ORAL DAILY
Qty: 90 TABLET | Refills: 0 | Status: SHIPPED | OUTPATIENT
Start: 2020-08-17 | End: 2020-11-16

## 2020-10-08 RX ORDER — CLOTRIMAZOLE 10 MG/1
LOZENGE ORAL; TOPICAL
Qty: 10 TABLET | Refills: 0 | Status: SHIPPED | OUTPATIENT
Start: 2020-10-08 | End: 2021-03-12

## 2020-10-13 ENCOUNTER — OFFICE VISIT (OUTPATIENT)
Dept: INTERNAL MEDICINE | Facility: CLINIC | Age: 30
End: 2020-10-13

## 2020-10-13 VITALS
HEIGHT: 70 IN | SYSTOLIC BLOOD PRESSURE: 122 MMHG | BODY MASS INDEX: 36.22 KG/M2 | DIASTOLIC BLOOD PRESSURE: 82 MMHG | TEMPERATURE: 96.6 F | HEART RATE: 71 BPM | OXYGEN SATURATION: 99 % | RESPIRATION RATE: 16 BRPM | WEIGHT: 253 LBS

## 2020-10-13 DIAGNOSIS — Z00.00 ENCOUNTER FOR PREVENTIVE HEALTH EXAMINATION: Primary | ICD-10-CM

## 2020-10-13 DIAGNOSIS — I10 ESSENTIAL HYPERTENSION: ICD-10-CM

## 2020-10-13 DIAGNOSIS — F98.8 ATTENTION DEFICIT DISORDER (ADD) WITHOUT HYPERACTIVITY: ICD-10-CM

## 2020-10-13 DIAGNOSIS — F41.9 ANXIETY: ICD-10-CM

## 2020-10-13 DIAGNOSIS — K21.9 GASTROESOPHAGEAL REFLUX DISEASE WITHOUT ESOPHAGITIS: ICD-10-CM

## 2020-10-13 LAB
ALBUMIN SERPL-MCNC: 5.2 G/DL (ref 3.5–5.2)
ALBUMIN/GLOB SERPL: 2.7 G/DL
ALP SERPL-CCNC: 53 U/L (ref 39–117)
ALT SERPL-CCNC: 29 U/L (ref 1–41)
AST SERPL-CCNC: 15 U/L (ref 1–40)
BASOPHILS # BLD AUTO: 0.04 10*3/MM3 (ref 0–0.2)
BASOPHILS NFR BLD AUTO: 0.5 % (ref 0–1.5)
BILIRUB SERPL-MCNC: 0.4 MG/DL (ref 0–1.2)
BUN SERPL-MCNC: 12 MG/DL (ref 6–20)
BUN/CREAT SERPL: 12.2 (ref 7–25)
CALCIUM SERPL-MCNC: 9.4 MG/DL (ref 8.6–10.5)
CHLORIDE SERPL-SCNC: 99 MMOL/L (ref 98–107)
CHOLEST SERPL-MCNC: 187 MG/DL (ref 0–200)
CHOLEST/HDLC SERPL: 3.46 {RATIO}
CLARITY, POC: CLEAR
CO2 SERPL-SCNC: 30.4 MMOL/L (ref 22–29)
COLOR UR: YELLOW
CREAT SERPL-MCNC: 0.98 MG/DL (ref 0.76–1.27)
EOSINOPHIL # BLD AUTO: 0.12 10*3/MM3 (ref 0–0.4)
EOSINOPHIL NFR BLD AUTO: 1.5 % (ref 0.3–6.2)
ERYTHROCYTE [DISTWIDTH] IN BLOOD BY AUTOMATED COUNT: 12.1 % (ref 12.3–15.4)
GLOBULIN SER CALC-MCNC: 1.9 GM/DL
GLUCOSE SERPL-MCNC: 84 MG/DL (ref 65–99)
HCT VFR BLD AUTO: 46.1 % (ref 37.5–51)
HDLC SERPL-MCNC: 54 MG/DL (ref 40–60)
HGB BLD-MCNC: 15.5 G/DL (ref 13–17.7)
IMM GRANULOCYTES # BLD AUTO: 0.05 10*3/MM3 (ref 0–0.05)
IMM GRANULOCYTES NFR BLD AUTO: 0.6 % (ref 0–0.5)
LDLC SERPL CALC-MCNC: 96 MG/DL (ref 0–100)
LYMPHOCYTES # BLD AUTO: 3.1 10*3/MM3 (ref 0.7–3.1)
LYMPHOCYTES NFR BLD AUTO: 38.3 % (ref 19.6–45.3)
MCH RBC QN AUTO: 31.7 PG (ref 26.6–33)
MCHC RBC AUTO-ENTMCNC: 33.6 G/DL (ref 31.5–35.7)
MCV RBC AUTO: 94.3 FL (ref 79–97)
MONOCYTES # BLD AUTO: 0.93 10*3/MM3 (ref 0.1–0.9)
MONOCYTES NFR BLD AUTO: 11.5 % (ref 5–12)
NEUTROPHILS # BLD AUTO: 3.86 10*3/MM3 (ref 1.7–7)
NEUTROPHILS NFR BLD AUTO: 47.6 % (ref 42.7–76)
NRBC BLD AUTO-RTO: 0 /100 WBC (ref 0–0.2)
PH UR: 6 [PH] (ref 5–8)
PLATELET # BLD AUTO: 295 10*3/MM3 (ref 140–450)
POTASSIUM SERPL-SCNC: 4.8 MMOL/L (ref 3.5–5.2)
PROT SERPL-MCNC: 7.1 G/DL (ref 6–8.5)
PROT UR STRIP-MCNC: ABNORMAL MG/DL
RBC # BLD AUTO: 4.89 10*6/MM3 (ref 4.14–5.8)
RBC # UR STRIP: NEGATIVE /UL
SODIUM SERPL-SCNC: 138 MMOL/L (ref 136–145)
SP GR UR: 1.02 (ref 1–1.03)
TRIGL SERPL-MCNC: 216 MG/DL (ref 0–150)
VLDLC SERPL CALC-MCNC: 37 MG/DL (ref 5–40)
WBC # BLD AUTO: 8.1 10*3/MM3 (ref 3.4–10.8)

## 2020-10-13 PROCEDURE — 99395 PREV VISIT EST AGE 18-39: CPT | Performed by: INTERNAL MEDICINE

## 2020-10-13 PROCEDURE — 81003 URINALYSIS AUTO W/O SCOPE: CPT | Performed by: INTERNAL MEDICINE

## 2020-10-13 NOTE — PROGRESS NOTES
Subjective   Primitivo Reeves is a 30 y.o. male.     Chief Complaint   Patient presents with   • Annual Exam         In for annual preventative exam.  Sleeps 7-8 hours per night.  Sleep is good.  Exercises 2 days per week.  Energy is satisfactory.  Diet is balanced.       The following portions of the patient's history were reviewed and updated as appropriate: allergies, current medications, past social history and problem list.    HISTORY  Outpatient Medications Marked as Taking for the 10/13/20 encounter (Office Visit) with Chicho Mckeon MD   Medication Sig Dispense Refill   • atenolol (TENORMIN) 50 MG tablet TAKE 1 TABLET BY MOUTH DAILY 90 tablet 0   • olmesartan (BENICAR) 20 MG tablet Take 0.5 tablets by mouth Daily. 45 tablet 1     Social History     Socioeconomic History   • Marital status:      Spouse name: Not on file   • Number of children: Not on file   • Years of education: Not on file   • Highest education level: Not on file   Tobacco Use   • Smoking status: Former Smoker     Packs/day: 1.00     Years: 3.00     Pack years: 3.00     Types: Cigarettes     Start date:      Quit date:      Years since quittin.7   • Smokeless tobacco: Current User     Types: Chew     Last attempt to quit: 2017   Substance and Sexual Activity   • Alcohol use: Not Currently     Alcohol/week: 15.0 standard drinks     Types: 15 Cans of beer per week     Frequency: 2-3 times a week     Drinks per session: 3 or 4     Binge frequency: Weekly   • Drug use: Not Currently     Frequency: 5.0 times per week     Types: Marijuana     Family History   Problem Relation Age of Onset   • No Known Problems Mother    • No Known Problems Brother    • No Known Problems Son      Past Medical History:   Diagnosis Date   • ADD (attention deficit disorder)    • Anxiety, generalized    • AR (allergic rhinitis)    • GERD (gastroesophageal reflux disease)    • Hypertension      Past Surgical History:   Procedure Laterality Date   •  PILONIDAL CYSTECTOMY         Review of Systems   Constitutional: Negative for chills, diaphoresis, fatigue, fever and unexpected weight change.   HENT: Negative for congestion, ear pain, nosebleeds, rhinorrhea, sore throat and voice change.    Eyes: Negative for discharge, itching and visual disturbance.   Respiratory: Negative for cough, chest tightness and wheezing.    Cardiovascular: Negative for leg swelling.   Gastrointestinal: Positive for abdominal pain (gerd). Negative for anal bleeding, constipation, diarrhea, nausea and vomiting.   Endocrine: Negative for cold intolerance, heat intolerance and polyuria.   Genitourinary: Negative for difficulty urinating, dysuria, frequency, hematuria and urgency.   Musculoskeletal: Negative for arthralgias, back pain and myalgias.   Skin: Negative for rash and wound.   Allergic/Immunologic: Positive for environmental allergies.   Neurological: Negative for dizziness, syncope, weakness and light-headedness.   Hematological: Negative for adenopathy. Does not bruise/bleed easily.   Psychiatric/Behavioral: Positive for decreased concentration. Negative for confusion and sleep disturbance. The patient is nervous/anxious.        Objective   Vitals:    10/13/20 1432   BP: 122/82   Pulse: 71   Resp: 16   Temp: 96.6 °F (35.9 °C)   SpO2: 99%          10/13/20  1432   Weight: 115 kg (253 lb)    [unfilled]  Body mass index is 36.3 kg/m².      Physical Exam   Constitutional: He is oriented to person, place, and time. He appears well-developed.   HENT:   Head: Normocephalic and atraumatic.   Right Ear: External ear normal.   Left Ear: External ear normal.   Nose: Nose normal.   Eyes: Pupils are equal, round, and reactive to light. Conjunctivae are normal. No scleral icterus.   Neck: Normal range of motion. Neck supple. No JVD present. No thyromegaly present.   Cardiovascular: Normal rate, regular rhythm and normal heart sounds. Exam reveals no gallop and no friction rub.   No murmur  heard.  Pulmonary/Chest: Effort normal and breath sounds normal. No respiratory distress. He has no wheezes. He has no rales.   Abdominal: Soft. Bowel sounds are normal. He exhibits no distension and no mass. There is no abdominal tenderness. There is no rebound and no guarding. No hernia.   Musculoskeletal: Normal range of motion.   Lymphadenopathy:     He has no cervical adenopathy.   Neurological: He is alert and oriented to person, place, and time. He has normal reflexes. He displays normal reflexes.   Skin: Skin is warm and dry.   Psychiatric: His behavior is normal. Judgment and thought content normal.   Nursing note and vitals reviewed.        Problems Addressed this Visit        Cardiovascular and Mediastinum    Essential hypertension       Digestive    Gastroesophageal reflux disease without esophagitis       Other    ADD (attention deficit disorder)    Anxiety      Other Visit Diagnoses     Encounter for preventive health examination    -  Primary      Diagnoses       Codes Comments    Encounter for preventive health examination    -  Primary ICD-10-CM: Z00.00  ICD-9-CM: V70.0     Essential hypertension     ICD-10-CM: I10  ICD-9-CM: 401.9     Gastroesophageal reflux disease without esophagitis     ICD-10-CM: K21.9  ICD-9-CM: 530.81     Anxiety     ICD-10-CM: F41.9  ICD-9-CM: 300.00     Attention deficit disorder (ADD) without hyperactivity     ICD-10-CM: F98.8  ICD-9-CM: 314.00         Assessment/Plan   In for annual preventative exam.  Blood pressure is good today.  Previously well-controlled.  He is off of ADD medicine.  He's had some significant anxiety and panic in the past.  That's doing better.  He will get his annual lab work today, October 2020 including CBC, CMP, lipids, UA.  We'll continue to monitor blood pressure at 6 month intervals.  He is otherwise up-to-date and doing well.  He needs to lose some weight.  He is one HPP today.     Prevention counseling was performed today. The counseling  performed was routine health maintenance topics.    PPE today includes face mask and eye shield.         Dragon disclaimer:   Much of this encounter note is an electronic transcription/translation of spoken language to printed text. The electronic translation of spoken language may permit erroneous, or at times, nonsensical words or phrases to be inadvertently transcribed; Although I have reviewed the note for such errors, some may still exist.

## 2020-11-16 RX ORDER — ATENOLOL 50 MG/1
50 TABLET ORAL DAILY
Qty: 90 TABLET | Refills: 1 | Status: SHIPPED | OUTPATIENT
Start: 2020-11-16 | End: 2021-03-22

## 2020-12-21 RX ORDER — OLMESARTAN MEDOXOMIL 20 MG/1
TABLET ORAL
Qty: 45 TABLET | Refills: 1 | Status: SHIPPED | OUTPATIENT
Start: 2020-12-21 | End: 2021-06-14

## 2021-03-12 ENCOUNTER — OFFICE VISIT (OUTPATIENT)
Dept: INTERNAL MEDICINE | Facility: CLINIC | Age: 31
End: 2021-03-12

## 2021-03-12 VITALS
TEMPERATURE: 97.3 F | HEIGHT: 70 IN | SYSTOLIC BLOOD PRESSURE: 124 MMHG | HEART RATE: 61 BPM | WEIGHT: 237 LBS | DIASTOLIC BLOOD PRESSURE: 88 MMHG | BODY MASS INDEX: 33.93 KG/M2 | OXYGEN SATURATION: 99 % | RESPIRATION RATE: 18 BRPM

## 2021-03-12 DIAGNOSIS — I10 ESSENTIAL HYPERTENSION: Primary | ICD-10-CM

## 2021-03-12 DIAGNOSIS — F98.8 ATTENTION DEFICIT DISORDER (ADD) WITHOUT HYPERACTIVITY: ICD-10-CM

## 2021-03-12 DIAGNOSIS — K21.9 GASTROESOPHAGEAL REFLUX DISEASE WITHOUT ESOPHAGITIS: ICD-10-CM

## 2021-03-12 PROCEDURE — 99213 OFFICE O/P EST LOW 20 MIN: CPT | Performed by: INTERNAL MEDICINE

## 2021-03-12 NOTE — PROGRESS NOTES
Subjective   Primitivo Reeves is a 30 y.o. male.     Chief Complaint   Patient presents with   • Hypertension         Hypertension  This is a chronic problem. The current episode started more than 1 year ago. The problem is unchanged. Associated symptoms include anxiety. Pertinent negatives include no chest pain, palpitations, peripheral edema or shortness of breath. There are no associated agents to hypertension. Risk factors for coronary artery disease include male gender. Current antihypertension treatment includes beta blockers. The current treatment provides no improvement. There is no history of angina, kidney disease, CAD/MI, CVA or heart failure.   Anxiety  Presents for follow-up visit. Patient reports no chest pain, palpitations or shortness of breath. Symptoms occur occasionally. The severity of symptoms is moderate.            The following portions of the patient's history were reviewed and updated as appropriate: allergies, current medications, past social history and problem list.    Outpatient Medications Marked as Taking for the 3/12/21 encounter (Office Visit) with Chicho Mckeon MD   Medication Sig Dispense Refill   • atenolol (TENORMIN) 50 MG tablet TAKE 1 TABLET BY MOUTH DAILY 90 tablet 1   • olmesartan (BENICAR) 20 MG tablet TAKE 1/2 TABLET BY MOUTH DAILY 45 tablet 1       Review of Systems   Constitutional: Negative for chills and fever.   Respiratory: Negative for shortness of breath.    Cardiovascular: Negative for chest pain and palpitations.   Gastrointestinal: Negative for abdominal pain.       Objective   Vitals:    03/12/21 1512   BP: 124/88   Pulse: 61   Resp: 18   Temp: 97.3 °F (36.3 °C)   SpO2: 99%          03/12/21  1512   Weight: 108 kg (237 lb)    [unfilled]  Body mass index is 34.01 kg/m².      Physical Exam   Constitutional: He appears well-developed. No distress.   HENT:   Head: Normocephalic and atraumatic.   Neck: Carotid bruit is not present.   Cardiovascular: Normal rate,  regular rhythm and normal heart sounds. Exam reveals no gallop.   No murmur heard.  Pulmonary/Chest: Effort normal and breath sounds normal. No respiratory distress. He has no wheezes. He has no rales.   Abdominal: Soft. Bowel sounds are normal. He exhibits no mass. There is no abdominal tenderness. There is no guarding.         Problems Addressed this Visit        Cardiac and Vasculature    Essential hypertension - Primary       Gastrointestinal Abdominal     Gastroesophageal reflux disease without esophagitis       Mental Health    ADD (attention deficit disorder)      Diagnoses       Codes Comments    Essential hypertension    -  Primary ICD-10-CM: I10  ICD-9-CM: 401.9     Gastroesophageal reflux disease without esophagitis     ICD-10-CM: K21.9  ICD-9-CM: 530.81     Attention deficit disorder (ADD) without hyperactivity     ICD-10-CM: F98.8  ICD-9-CM: 314.00         Assessment/Plan   In for recheck of hypertension and GERD.  No GERD since cutting back on alcohol.  Blood pressure is much better as well.  He is off of ADD medicine.  He's had some significant anxiety and panic in the past.  That's doing better.  He will got his annual lab work 10/2020.  We'll continue to monitor blood pressure at 6 month intervals.  Weight is down 35 pounds in the last 12 months.  He is off of alcohol which is soft a lot of his problems.  He has been off of THC for 1 week.  Having some increased anxiety.  May be having some withdrawal.  Alternatively may just have untreated anxiety at this point as he was self-medicating.  He has been using some cognitive behavioral techniques.  We will continue with those.  If the anxiety does not resolve over the next 4 to 6 weeks we will consider an SSRI.  Right now he is very reluctant to take medication for anxiety.  We will make a decision at his 6-month blood pressure checkup in April.    The above information was reviewed again today 03/12/21.  It continues to be accurate as reflected above  and is unchanged.  History, physical and review of systems all reviewed and are unchanged.  Medications were reviewed today and continue the current dosing.    PPE today includes face mask and eye shield.           Dragon disclaimer:   Much of this encounter note is an electronic transcription/translation of spoken language to printed text. The electronic translation of spoken language may permit erroneous, or at times, nonsensical words or phrases to be inadvertently transcribed; Although I have reviewed the note for such errors, some may still exist.

## 2021-03-22 RX ORDER — ATENOLOL 50 MG/1
50 TABLET ORAL DAILY
Qty: 90 TABLET | Refills: 1 | Status: SHIPPED | OUTPATIENT
Start: 2021-03-22 | End: 2021-09-17

## 2021-04-15 ENCOUNTER — TELEMEDICINE (OUTPATIENT)
Dept: INTERNAL MEDICINE | Facility: CLINIC | Age: 31
End: 2021-04-15

## 2021-04-15 DIAGNOSIS — F41.9 ANXIETY: ICD-10-CM

## 2021-04-15 DIAGNOSIS — F98.8 ATTENTION DEFICIT DISORDER (ADD) WITHOUT HYPERACTIVITY: ICD-10-CM

## 2021-04-15 DIAGNOSIS — I10 ESSENTIAL HYPERTENSION: Primary | ICD-10-CM

## 2021-04-15 PROCEDURE — 99213 OFFICE O/P EST LOW 20 MIN: CPT | Performed by: INTERNAL MEDICINE

## 2021-04-15 NOTE — PROGRESS NOTES
Subjective   Primitivo Reeves is a 30 y.o. male.     Chief Complaint   Patient presents with   • Hypertension   • Anxiety   • Heartburn         Hypertension  This is a chronic problem. The current episode started more than 1 year ago. The problem is unchanged. Associated symptoms include anxiety. Pertinent negatives include no chest pain, palpitations, peripheral edema or shortness of breath. There are no associated agents to hypertension. Risk factors for coronary artery disease include male gender. Current antihypertension treatment includes beta blockers. The current treatment provides no improvement. There is no history of angina, kidney disease, CAD/MI, CVA or heart failure.   Anxiety  Presents for follow-up visit. Symptoms include nervous/anxious behavior. Patient reports no chest pain, palpitations or shortness of breath. Symptoms occur occasionally. The severity of symptoms is moderate.            The following portions of the patient's history were reviewed and updated as appropriate: allergies, current medications, past social history and problem list.    Outpatient Medications Marked as Taking for the 4/15/21 encounter (Telemedicine) with Chicho Mckeon MD   Medication Sig Dispense Refill   • atenolol (TENORMIN) 50 MG tablet TAKE 1 TABLET BY MOUTH DAILY 90 tablet 1   • olmesartan (BENICAR) 20 MG tablet TAKE 1/2 TABLET BY MOUTH DAILY 45 tablet 1       Review of Systems   Respiratory: Negative for shortness of breath.    Cardiovascular: Negative for chest pain and palpitations.   Gastrointestinal: Negative for abdominal pain.   Psychiatric/Behavioral: The patient is nervous/anxious.        Objective   There were no vitals filed for this visit.   There were no vitals filed for this visit. [unfilled]  There is no height or weight on file to calculate BMI.      Physical Exam   Pulmonary/Chest: Effort normal.   Abdominal: Normal appearance.   Neurological: He is alert.   Psychiatric: His behavior is normal.  Mood, judgment and thought content normal.         Problems Addressed this Visit        Cardiac and Vasculature    Essential hypertension - Primary (Chronic)       Mental Health    ADD (attention deficit disorder) (Chronic)    Anxiety (Chronic)      Diagnoses       Codes Comments    Essential hypertension    -  Primary ICD-10-CM: I10  ICD-9-CM: 401.9     Attention deficit disorder (ADD) without hyperactivity     ICD-10-CM: F98.8  ICD-9-CM: 314.00     Anxiety     ICD-10-CM: F41.9  ICD-9-CM: 300.00         Assessment/Plan   In for recheck htn, anxiety and ADD.  He is off of ADD medicine.  He's had some significant anxiety and panic in the past.  That's doing better.  He will got his annual lab work 10/2020.  We'll continue to monitor blood pressure at 6 month intervals.  He is off of alcohol which is soft a lot of his problems.  He has been off of THC for 1 week.  Having some increased anxiety.  May be having some withdrawal.  Alternatively may just have untreated anxiety at this point as he was self-medicating.  He has been using some cognitive behavioral techniques.  We will continue with those.  The plan was to begin on an SSRI if his symptoms did not start to sandeep.  Right now he is doing much better.  Certainly he is under control.  Would like to hold off on medication which is fine.  He is due for hep C antibody.  Due for Covid vaccine.  Annual preventive exam in October 2021.    The above information was reviewed again today 04/15/21.  It continues to be accurate as reflected above and is unchanged.  History, physical and review of systems all reviewed and are unchanged.  Medications were reviewed today and continue the current dosing.           Luciano disclaimer:   Much of this encounter note is an electronic transcription/translation of spoken language to printed text. The electronic translation of spoken language may permit erroneous, or at times, nonsensical words or phrases to be inadvertently transcribed;  Although I have reviewed the note for such errors, some may still exist.

## 2021-04-16 ENCOUNTER — BULK ORDERING (OUTPATIENT)
Dept: CASE MANAGEMENT | Facility: OTHER | Age: 31
End: 2021-04-16

## 2021-04-16 DIAGNOSIS — Z23 IMMUNIZATION DUE: ICD-10-CM

## 2021-06-14 RX ORDER — OLMESARTAN MEDOXOMIL 20 MG/1
TABLET ORAL
Qty: 45 TABLET | Refills: 1 | Status: SHIPPED | OUTPATIENT
Start: 2021-06-14 | End: 2021-12-16

## 2021-07-20 ENCOUNTER — PATIENT MESSAGE (OUTPATIENT)
Dept: INTERNAL MEDICINE | Facility: CLINIC | Age: 31
End: 2021-07-20

## 2021-07-20 ENCOUNTER — TELEPHONE (OUTPATIENT)
Dept: INTERNAL MEDICINE | Facility: CLINIC | Age: 31
End: 2021-07-20

## 2021-07-20 NOTE — TELEPHONE ENCOUNTER
Let patient know that unfortunately Adderall is controlled and he has to be seen in order to resume it. Generally we have to see people at 3-month intervals. I could arrange a zoom visit for him if it would help.    ----- Message from Ceci Cruz MA sent at 7/20/2021  3:49 PM EDT -----  Regarding: FW: Prescription Question  Contact: [phone]      ----- Message -----  From: Primitivo Reeves  Sent: 7/20/2021   2:47 PM EDT  To: Yael Mckeon [facility]  Subject: Prescription Question                            Hey I was just wondering if I could get my adderall refilled. Been having trouble focusing at work

## 2021-09-17 RX ORDER — ATENOLOL 50 MG/1
50 TABLET ORAL DAILY
Qty: 90 TABLET | Refills: 1 | Status: SHIPPED | OUTPATIENT
Start: 2021-09-17 | End: 2022-03-16

## 2021-12-16 RX ORDER — OLMESARTAN MEDOXOMIL 20 MG/1
TABLET ORAL
Qty: 45 TABLET | Refills: 1 | Status: SHIPPED | OUTPATIENT
Start: 2021-12-16 | End: 2022-05-23

## 2022-01-14 ENCOUNTER — TELEMEDICINE (OUTPATIENT)
Dept: INTERNAL MEDICINE | Facility: CLINIC | Age: 32
End: 2022-01-14

## 2022-01-14 DIAGNOSIS — F41.9 ANXIETY: Primary | ICD-10-CM

## 2022-01-14 PROCEDURE — 99213 OFFICE O/P EST LOW 20 MIN: CPT | Performed by: INTERNAL MEDICINE

## 2022-01-14 RX ORDER — BUPROPION HYDROCHLORIDE 150 MG/1
150 TABLET ORAL DAILY
Qty: 90 TABLET | Refills: 1 | Status: SHIPPED | OUTPATIENT
Start: 2022-01-14 | End: 2022-05-23

## 2022-01-14 NOTE — PROGRESS NOTES
Subjective   Primitivo Reeves is a 31 y.o. male.     Chief Complaint   Patient presents with   • Anxiety         Anxiety  Presents for follow-up visit. Symptoms include nervous/anxious behavior and obsessions. The quality of sleep is good.            The following portions of the patient's history were reviewed and updated as appropriate: allergies, current medications, past social history and problem list.    Outpatient Medications Marked as Taking for the 1/14/22 encounter (Telemedicine) with Chicho Mckeon MD   Medication Sig Dispense Refill   • atenolol (TENORMIN) 50 MG tablet TAKE 1 TABLET BY MOUTH DAILY 90 tablet 1   • olmesartan (BENICAR) 20 MG tablet TAKE 1/2 TABLET BY MOUTH DAILY 45 tablet 1       Review of Systems   Psychiatric/Behavioral: Negative for dysphoric mood. The patient is nervous/anxious.        Objective   There were no vitals filed for this visit.   Wt Readings from Last 3 Encounters:   03/12/21 108 kg (237 lb)   10/13/20 115 kg (253 lb)   10/04/19 112 kg (247 lb)    There is no height or weight on file to calculate BMI.      Physical Exam  Constitutional:       Appearance: Normal appearance.   Pulmonary:      Effort: Pulmonary effort is normal.   Neurological:      Mental Status: He is alert.   Psychiatric:         Mood and Affect: Mood normal.         Behavior: Behavior normal.         Thought Content: Thought content normal.         Judgment: Judgment normal.           Problems Addressed this Visit        Mental Health    Anxiety - Primary (Chronic)      Diagnoses       Codes Comments    Anxiety    -  Primary ICD-10-CM: F41.9  ICD-9-CM: 300.00         Assessment/Plan   He is off of ADD medicine.  He's had some significant anxiety and panic in the past.  That's doing better.  Having some increased anxiety.  Likely has untreated anxiety at this point as he was self-medicating.  He had been using some cognitive behavioral techniques but not lately.  The plan was to begin on an SSRI  if his symptoms did not start to sandeep.  Worries about sexual dysfunction with them.  Spent 17 minutes with patient on the visit today.  [employer] platform used.  Has annual preventive visit scheduled for March 31, 2022.    The above information was reviewed again today 01/14/22.  It continues to be accurate as reflected above and is unchanged.  History, physical and review of systems all reviewed and are unchanged.  Medications were reviewed today and continue the current dosing.             Luciano disclaimer:   Much of this encounter note is an electronic transcription/translation of spoken language to printed text. The electronic translation of spoken language may permit erroneous, or at times, nonsensical words or phrases to be inadvertently transcribed; Although I have reviewed the note for such errors, some may still exist.

## 2022-03-16 RX ORDER — ATENOLOL 50 MG/1
50 TABLET ORAL DAILY
Qty: 90 TABLET | Refills: 1 | Status: SHIPPED | OUTPATIENT
Start: 2022-03-16 | End: 2022-11-20 | Stop reason: SDUPTHER

## 2022-03-31 ENCOUNTER — OFFICE VISIT (OUTPATIENT)
Dept: INTERNAL MEDICINE | Facility: CLINIC | Age: 32
End: 2022-03-31

## 2022-03-31 VITALS
SYSTOLIC BLOOD PRESSURE: 120 MMHG | HEART RATE: 62 BPM | RESPIRATION RATE: 18 BRPM | BODY MASS INDEX: 35.79 KG/M2 | OXYGEN SATURATION: 98 % | TEMPERATURE: 98 F | WEIGHT: 250 LBS | DIASTOLIC BLOOD PRESSURE: 70 MMHG | HEIGHT: 70 IN

## 2022-03-31 DIAGNOSIS — Z11.59 NEED FOR HEPATITIS C SCREENING TEST: ICD-10-CM

## 2022-03-31 DIAGNOSIS — Z00.00 ENCOUNTER FOR PREVENTIVE HEALTH EXAMINATION: Primary | ICD-10-CM

## 2022-03-31 DIAGNOSIS — F98.8 ATTENTION DEFICIT DISORDER (ADD) WITHOUT HYPERACTIVITY: Chronic | ICD-10-CM

## 2022-03-31 DIAGNOSIS — I10 ESSENTIAL HYPERTENSION: Chronic | ICD-10-CM

## 2022-03-31 LAB
CLARITY, POC: CLEAR
COLOR UR: YELLOW
EXPIRATION DATE: ABNORMAL
GLUCOSE UR STRIP-MCNC: NEGATIVE MG/DL
Lab: ABNORMAL
PH UR: 6 [PH] (ref 5–8)
PROT UR STRIP-MCNC: ABNORMAL MG/DL
RBC # UR STRIP: NEGATIVE /UL
SP GR UR: 1.02 (ref 1–1.03)

## 2022-03-31 PROCEDURE — 81003 URINALYSIS AUTO W/O SCOPE: CPT | Performed by: INTERNAL MEDICINE

## 2022-03-31 PROCEDURE — 99395 PREV VISIT EST AGE 18-39: CPT | Performed by: INTERNAL MEDICINE

## 2022-03-31 NOTE — PROGRESS NOTES
Subjective   Primitivo Reeves is a 31 y.o. male.     Chief Complaint   Patient presents with   • Annual Exam   • Hypertension   • ADD   • Lt. thumb pain         In for annual preventative exam.  Sleeps 6-7 hours per night.  Sleep is good.  Exercises 2 days per week.  Energy is satisfactory.  Diet is balanced.    Hypertension  This is a chronic problem. The current episode started more than 1 year ago. The problem is unchanged. Pertinent negatives include no chest pain, headaches, palpitations or shortness of breath.   ADD  Associated symptoms include abdominal pain (gerd). Pertinent negatives include no arthralgias, chest pain, chills, coughing, diaphoresis, fatigue, fever, headaches, myalgias, nausea, vomiting or weakness.        The following portions of the patient's history were reviewed and updated as appropriate: allergies, current medications, past social history and problem list.    HISTORY  Outpatient Medications Marked as Taking for the 3/31/22 encounter (Office Visit) with Chicho Mckeon MD   Medication Sig Dispense Refill   • atenolol (TENORMIN) 50 MG tablet TAKE 1 TABLET BY MOUTH DAILY 90 tablet 1   • buPROPion XL (Wellbutrin XL) 150 MG 24 hr tablet Take 1 tablet by mouth Daily. 90 tablet 1   • olmesartan (BENICAR) 20 MG tablet TAKE 1/2 TABLET BY MOUTH DAILY 45 tablet 1     Social History     Socioeconomic History   • Marital status:    Tobacco Use   • Smoking status: Former Smoker     Packs/day: 1.00     Years: 3.00     Pack years: 3.00     Types: Cigarettes     Start date:      Quit date:      Years since quittin.2   • Smokeless tobacco: Former User     Types: Chew     Quit date:    Substance and Sexual Activity   • Alcohol use: Not Currently     Alcohol/week: 15.0 standard drinks     Types: 15 Cans of beer per week   • Drug use: Not Currently     Frequency: 5.0 times per week     Types: Marijuana     Family History   Problem Relation Age of Onset   • No Known Problems  Mother    • No Known Problems Brother    • No Known Problems Son      Past Medical History:   Diagnosis Date   • ADD (attention deficit disorder)    • Anxiety, generalized    • AR (allergic rhinitis)    • GERD (gastroesophageal reflux disease)    • Hypertension      Past Surgical History:   Procedure Laterality Date   • PILONIDAL CYSTECTOMY         Review of Systems   Constitutional: Negative for chills, diaphoresis, fatigue, fever and unexpected weight change.   Respiratory: Negative for cough, chest tightness, shortness of breath and wheezing.    Cardiovascular: Negative for chest pain, palpitations and leg swelling.   Gastrointestinal: Positive for abdominal pain (gerd). Negative for anal bleeding, blood in stool, constipation, diarrhea, nausea, rectal pain and vomiting.   Endocrine: Negative for cold intolerance, heat intolerance and polyuria.   Genitourinary: Negative for difficulty urinating, dysuria, frequency, hematuria and urgency.   Musculoskeletal: Negative for arthralgias, back pain and myalgias.   Allergic/Immunologic: Positive for environmental allergies.   Neurological: Negative for dizziness, syncope, weakness, light-headedness and headaches.   Hematological: Negative for adenopathy. Does not bruise/bleed easily.   Psychiatric/Behavioral: Positive for decreased concentration. Negative for confusion, dysphoric mood and sleep disturbance. The patient is nervous/anxious.        Objective   Vitals:    03/31/22 1418   BP: 120/70   Pulse: 62   Resp: 18   Temp: 98 °F (36.7 °C)   SpO2: 98%          03/31/22  1418   Weight: 113 kg (250 lb)    [unfilled]  Body mass index is 35.87 kg/m².      Physical Exam   Constitutional: He is oriented to person, place, and time. He appears well-developed.   HENT:   Head: Normocephalic and atraumatic.   Right Ear: External ear normal.   Left Ear: External ear normal.   Nose: Nose normal.   Eyes: Pupils are equal, round, and reactive to light. Conjunctivae are normal. No  scleral icterus.   Neck: No JVD present. No thyromegaly present.   Cardiovascular: Normal rate, regular rhythm and normal heart sounds. Exam reveals no gallop and no friction rub.   No murmur heard.  Pulmonary/Chest: Effort normal and breath sounds normal. No respiratory distress. He has no wheezes. He has no rales.   Abdominal: Soft. Normal appearance and bowel sounds are normal. He exhibits no distension and no mass. There is no abdominal tenderness. There is no rebound and no guarding. No hernia.   Musculoskeletal: Normal range of motion.   Lymphadenopathy:     He has no cervical adenopathy.   Neurological: He is alert and oriented to person, place, and time. He has normal reflexes. He displays normal reflexes.   Skin: Skin is warm and dry.   Psychiatric: His behavior is normal. Mood, judgment and thought content normal.   Nursing note and vitals reviewed.        Problems Addressed this Visit        Cardiac and Vasculature    Essential hypertension (Chronic)       Mental Health    ADD (attention deficit disorder) (Chronic)      Other Visit Diagnoses     Encounter for preventive health examination    -  Primary      Diagnoses       Codes Comments    Encounter for preventive health examination    -  Primary ICD-10-CM: Z00.00  ICD-9-CM: V70.0     Essential hypertension     ICD-10-CM: I10  ICD-9-CM: 401.9     Attention deficit disorder (ADD) without hyperactivity     ICD-10-CM: F98.8  ICD-9-CM: 314.00         Assessment/Plan   In for annual preventive exam.  Blood pressure is good today.  Previously well-controlled.  He is off of ADD medicine.  He's had some significant anxiety and panic in the past.  That's doing better.  He will get his annual lab work today, March 2022 including CBC, CMP, lipids, hep C antibody and UA.  We'll continue to monitor blood pressure at 6 month intervals.  He is otherwise up-to-date and doing well.  He needs to lose some weight.  He is fasting today.     Prevention counseling was  performed today. The counseling performed was routine health maintenance topics including BMI and exercise.    The above information was reviewed again today 03/31/22.  It continues to be accurate as reflected above and is unchanged.  History, physical and review of systems all reviewed and are unchanged.  Medications were reviewed today and continue the current dosing.    PPE today includes face mask and eye shield.           Dragon disclaimer:   Much of this encounter note is an electronic transcription/translation of spoken language to printed text. The electronic translation of spoken language may permit erroneous, or at times, nonsensical words or phrases to be inadvertently transcribed; Although I have reviewed the note for such errors, some may still exist.

## 2022-04-01 LAB
ALBUMIN SERPL-MCNC: 5 G/DL (ref 4–5)
ALBUMIN/GLOB SERPL: 2 {RATIO} (ref 1.2–2.2)
ALP SERPL-CCNC: 56 IU/L (ref 44–121)
ALT SERPL-CCNC: 33 IU/L (ref 0–44)
AST SERPL-CCNC: 19 IU/L (ref 0–40)
BASOPHILS # BLD AUTO: 0 X10E3/UL (ref 0–0.2)
BASOPHILS NFR BLD AUTO: 1 %
BILIRUB SERPL-MCNC: 0.7 MG/DL (ref 0–1.2)
BUN SERPL-MCNC: 14 MG/DL (ref 6–20)
BUN/CREAT SERPL: 14 (ref 9–20)
CALCIUM SERPL-MCNC: 9.7 MG/DL (ref 8.7–10.2)
CHLORIDE SERPL-SCNC: 99 MMOL/L (ref 96–106)
CHOLEST SERPL-MCNC: 204 MG/DL (ref 100–199)
CHOLEST/HDLC SERPL: 4.3 RATIO (ref 0–5)
CO2 SERPL-SCNC: 22 MMOL/L (ref 20–29)
CREAT SERPL-MCNC: 0.99 MG/DL (ref 0.76–1.27)
EGFRCR SERPLBLD CKD-EPI 2021: 103 ML/MIN/1.73
EOSINOPHIL # BLD AUTO: 0.1 X10E3/UL (ref 0–0.4)
EOSINOPHIL NFR BLD AUTO: 2 %
ERYTHROCYTE [DISTWIDTH] IN BLOOD BY AUTOMATED COUNT: 12 % (ref 11.6–15.4)
GLOBULIN SER CALC-MCNC: 2.5 G/DL (ref 1.5–4.5)
GLUCOSE SERPL-MCNC: 85 MG/DL (ref 65–99)
HCT VFR BLD AUTO: 45.2 % (ref 37.5–51)
HCV AB S/CO SERPL IA: <0.1 S/CO RATIO (ref 0–0.9)
HDLC SERPL-MCNC: 48 MG/DL
HGB BLD-MCNC: 15.5 G/DL (ref 13–17.7)
IMM GRANULOCYTES # BLD AUTO: 0 X10E3/UL (ref 0–0.1)
IMM GRANULOCYTES NFR BLD AUTO: 0 %
LDLC SERPL CALC-MCNC: 140 MG/DL (ref 0–99)
LYMPHOCYTES # BLD AUTO: 2 X10E3/UL (ref 0.7–3.1)
LYMPHOCYTES NFR BLD AUTO: 32 %
MCH RBC QN AUTO: 31 PG (ref 26.6–33)
MCHC RBC AUTO-ENTMCNC: 34.3 G/DL (ref 31.5–35.7)
MCV RBC AUTO: 90 FL (ref 79–97)
MONOCYTES # BLD AUTO: 0.7 X10E3/UL (ref 0.1–0.9)
MONOCYTES NFR BLD AUTO: 12 %
NEUTROPHILS # BLD AUTO: 3.4 X10E3/UL (ref 1.4–7)
NEUTROPHILS NFR BLD AUTO: 53 %
PLATELET # BLD AUTO: 274 X10E3/UL (ref 150–450)
POTASSIUM SERPL-SCNC: 4.5 MMOL/L (ref 3.5–5.2)
PROT SERPL-MCNC: 7.5 G/DL (ref 6–8.5)
RBC # BLD AUTO: 5 X10E6/UL (ref 4.14–5.8)
SODIUM SERPL-SCNC: 138 MMOL/L (ref 134–144)
TRIGL SERPL-MCNC: 89 MG/DL (ref 0–149)
VLDLC SERPL CALC-MCNC: 16 MG/DL (ref 5–40)
WBC # BLD AUTO: 6.3 X10E3/UL (ref 3.4–10.8)

## 2022-05-23 RX ORDER — OLMESARTAN MEDOXOMIL 20 MG/1
TABLET ORAL
Qty: 45 TABLET | Refills: 1 | Status: SHIPPED | OUTPATIENT
Start: 2022-05-23 | End: 2022-09-29 | Stop reason: SDUPTHER

## 2022-05-23 RX ORDER — BUPROPION HYDROCHLORIDE 150 MG/1
150 TABLET ORAL DAILY
Qty: 90 TABLET | Refills: 1 | Status: SHIPPED | OUTPATIENT
Start: 2022-05-23 | End: 2022-09-21

## 2022-06-07 ENCOUNTER — TELEPHONE (OUTPATIENT)
Dept: INTERNAL MEDICINE | Facility: CLINIC | Age: 32
End: 2022-06-07

## 2022-06-07 NOTE — TELEPHONE ENCOUNTER
Call patient.  He is on the smallest dose of Wellbutrin.  I am not sure it can be cut in half.  He should check with the pharmacist to see if it is something can be broken.  It is a long-acting tablet and sometimes they cannot be broken.  If not he can simply stop it.  Does not have to be tapered at this dose level.  I am a little surprised it would be causing the side effects that he is describing.  Of course if the symptoms resolve when he stops it that we will be pretty good prove that the medicine is causing those symptoms.      ----- Message from Ceci Cruz MA sent at 6/7/2022  1:03 PM EDT -----  Regarding: FW: Wellbutrin dosage  Please Advise      ----- Message -----  From: Primitivo Reeves  Sent: 6/7/2022  12:44 PM EDT  To: Yael Lowe Pool  Subject: Wellbutrin dosage                                Hello I was wanting to ask about possibly cutting my Wellbutrin dosage in half. I’ve been on it since January, and while I’m pleased with its help with anxiety, I’ve noticed I’ve felt flush and had a slight headache pretty consistently since taking it with no signs of lessening. Was wondering if it would be ok to try cutting the dosage in half because if not I think I’d rather just not take it and deal with my anxiety, which at that point I’d still need to taper off of the medicine. Thanks

## 2022-09-12 ENCOUNTER — TELEMEDICINE (OUTPATIENT)
Dept: INTERNAL MEDICINE | Facility: CLINIC | Age: 32
End: 2022-09-12

## 2022-09-12 DIAGNOSIS — J40 BRONCHITIS: Primary | ICD-10-CM

## 2022-09-12 PROCEDURE — 99213 OFFICE O/P EST LOW 20 MIN: CPT | Performed by: INTERNAL MEDICINE

## 2022-09-12 NOTE — PROGRESS NOTES
Mode of Visit: Video  Location of patient: home  Location of provider: [facility] [place] [facility]  You have chosen to receive care through a telehealth visit.  Does the patient consent to use a video/audio connection for your medical care today? Yes   The visit included audio and video interaction. No technical issues occurred during this visit.

## 2022-09-12 NOTE — PROGRESS NOTES
Subjective   Primitivo Reeves is a 32 y.o. male.     Chief Complaint   Patient presents with   • Cough   • Nasal Congestion         Cough  This is a chronic problem. The current episode started in the past 7 days. The problem has been unchanged. The cough is productive of sputum. Associated symptoms include rhinorrhea. Pertinent negatives include no chills, fever, postnasal drip or shortness of breath.        The following portions of the patient's history were reviewed and updated as appropriate: allergies, current medications, past social history and problem list.    Outpatient Medications Marked as Taking for the 9/12/22 encounter (Telemedicine) with Chicho Mckeon MD   Medication Sig Dispense Refill   • atenolol (TENORMIN) 50 MG tablet TAKE 1 TABLET BY MOUTH DAILY 90 tablet 1   • buPROPion XL (WELLBUTRIN XL) 150 MG 24 hr tablet TAKE 1 TABLET BY MOUTH DAILY 90 tablet 1   • olmesartan (BENICAR) 20 MG tablet TAKE 1/2 TABLET BY MOUTH DAILY 45 tablet 1       Review of Systems   Constitutional: Negative for chills and fever.   HENT: Positive for rhinorrhea. Negative for postnasal drip.    Respiratory: Positive for cough. Negative for shortness of breath.        Objective   There were no vitals filed for this visit.   Wt Readings from Last 3 Encounters:   03/31/22 113 kg (250 lb)   03/12/21 108 kg (237 lb)   10/13/20 115 kg (253 lb)    There is no height or weight on file to calculate BMI.      Physical Exam  Constitutional:       Appearance: Normal appearance.   Pulmonary:      Effort: Pulmonary effort is normal.   Neurological:      Mental Status: He is alert.   Psychiatric:         Mood and Affect: Mood normal.         Behavior: Behavior normal.         Thought Content: Thought content normal.         Judgment: Judgment normal.           Problems Addressed this Visit    None     Visit Diagnoses     Bronchitis    -  Primary      Diagnoses       Codes Comments    Bronchitis    -  Primary ICD-10-CM:  J40  ICD-9-CM: 490         Assessment & Plan   Video visit today due to COVID pandemic.  He has had a cough for 2 days.  Developing greenish sputum now.  No shortness of breath.  No fever.  2 children both had coughs 10 days or so ago.  Wife had a cough last week and cleared up pretty quickly with some antibiotics as did one of his children.  The first child was not treated in cleared up with OTCs reasonably well.  I explained to patient this is almost certainly viral rhonchi this.  For now we will treat with OTCs.  Warm tea with honey.  Further observation.  He will call me back in 10 days or so he is not clearing up or getting worse.  We discussed the negatives to antibiotic treatment including side effects as well as bacterial resistance.  He is very comfortable with waiting longer to treat.  I think his wife was pushing to have him treated early.  [employer] platform used for the visit today.    The above information was reviewed again today 09/12/22.  It continues to be accurate as reflected above and is unchanged.  History, physical and review of systems all reviewed and are unchanged.  Medications were reviewed today and continue the current dosing.             Luciano disclaimer:   Much of this encounter note is an electronic transcription/translation of spoken language to printed text. The electronic translation of spoken language may permit erroneous, or at times, nonsensical words or phrases to be inadvertently transcribed; Although I have reviewed the note for such errors, some may still exist.

## 2022-09-21 ENCOUNTER — TELEPHONE (OUTPATIENT)
Dept: INTERNAL MEDICINE | Facility: CLINIC | Age: 32
End: 2022-09-21

## 2022-09-21 ENCOUNTER — TELEMEDICINE (OUTPATIENT)
Dept: INTERNAL MEDICINE | Facility: CLINIC | Age: 32
End: 2022-09-21

## 2022-09-21 DIAGNOSIS — J40 BRONCHITIS: Primary | ICD-10-CM

## 2022-09-21 PROCEDURE — 99212 OFFICE O/P EST SF 10 MIN: CPT | Performed by: INTERNAL MEDICINE

## 2022-09-21 RX ORDER — AMOXICILLIN AND CLAVULANATE POTASSIUM 875; 125 MG/1; MG/1
1 TABLET, FILM COATED ORAL EVERY 12 HOURS SCHEDULED
Qty: 14 TABLET | Refills: 0 | Status: SHIPPED | OUTPATIENT
Start: 2022-09-21 | End: 2022-10-04

## 2022-09-21 NOTE — TELEPHONE ENCOUNTER
Caller: Primitivo Reeves    Relationship: Self    Best call back number: [phone]    What medication are you requesting: ANTIBIOTIC, COUGH MEDICATION    What are your current symptoms: CONGESTION IS GETTING WORSE, COUGH    How long have you been experiencing symptoms: ONE WEEK    Have you had these symptoms before:    [x] Yes  [] No    Have you been treated for these symptoms before:   [x] Yes  [] No    If a prescription is needed, what is your preferred pharmacy and phone number: [id number] [address] AT [facility] [phone] [facility] [phone]      Additional notes: PATIENT IS FEELING WORSE THEN WHEN HE HAD HIS APPT WITH DR NETTLES LAST WEEK, PLEASE ADVISE IF WE CAN SEND IN MEDICATION.

## 2022-09-21 NOTE — PROGRESS NOTES
Subjective   Primitivo Reeves is a 32 y.o. male.     Chief Complaint   Patient presents with   • Cough     - COVID   • Nasal Congestion   • Headache         Cough  This is a new problem. The current episode started 1 to 4 weeks ago. The problem has been unchanged. The cough is productive of sputum. Associated symptoms include headaches and rhinorrhea. Pertinent negatives include no chills, fever, postnasal drip or shortness of breath.        The following portions of the patient's history were reviewed and updated as appropriate: allergies, current medications, past social history and problem list.    Outpatient Medications Marked as Taking for the 9/21/22 encounter (Telemedicine) with Chicho Mckeon MD   Medication Sig Dispense Refill   • atenolol (TENORMIN) 50 MG tablet TAKE 1 TABLET BY MOUTH DAILY 90 tablet 1   • olmesartan (BENICAR) 20 MG tablet TAKE 1/2 TABLET BY MOUTH DAILY 45 tablet 1       Review of Systems   Constitutional: Negative for chills and fever.   HENT: Positive for rhinorrhea. Negative for postnasal drip.    Respiratory: Positive for cough. Negative for shortness of breath.    Neurological: Positive for headaches.       Objective   There were no vitals filed for this visit.   Wt Readings from Last 3 Encounters:   03/31/22 113 kg (250 lb)   03/12/21 108 kg (237 lb)   10/13/20 115 kg (253 lb)    There is no height or weight on file to calculate BMI.      Physical Exam  Constitutional:       Appearance: Normal appearance.   Pulmonary:      Effort: Pulmonary effort is normal.   Neurological:      Mental Status: He is alert.   Psychiatric:         Mood and Affect: Mood normal.         Behavior: Behavior normal.         Thought Content: Thought content normal.         Judgment: Judgment normal.           Problems Addressed this Visit    None     Visit Diagnoses     Bronchitis    -  Primary      Diagnoses       Codes Comments    Bronchitis    -  Primary ICD-10-CM: J40  ICD-9-CM: 490          Assessment & Plan   Video visit today due to COVID pandemic.  He has had a cough for 11 days.  Developed greenish sputum.  We treated him symptomatically and he improved only to backslide over the past several days.  Cough is getting worse again.  Head congestion getting worse again.  Headaches getting worse again.  His body aches are improved.  There is no fever.  We will go ahead and start on Augmentin 875 twice daily for 1 week.  Continue with symptomatic support.  [employer] platform used for the visit today.    The above information was reviewed again today 09/21/22.  It continues to be accurate as reflected above and is unchanged.  History, physical and review of systems all reviewed and are unchanged.  Medications were reviewed today and continue the current dosing.    PPE today includes face mask and eye shield.               Dragon disclaimer:   Much of this encounter note is an electronic transcription/translation of spoken language to printed text. The electronic translation of spoken language may permit erroneous, or at times, nonsensical words or phrases to be inadvertently transcribed; Although I have reviewed the note for such errors, some may still exist.

## 2022-09-21 NOTE — PROGRESS NOTES
Mode of Visit: Video  Location of patient: home  Location of provider: [facility] [place] [facility]  You have chosen to receive care through a telehealth visit.  Does the patient consent to use a video/audio connection for your medical care today?YES  The visit included audio and video interaction. No technical issues occurred during this visit.

## 2022-09-29 RX ORDER — OLMESARTAN MEDOXOMIL 20 MG/1
10 TABLET ORAL DAILY
Qty: 45 TABLET | Refills: 1 | Status: SHIPPED | OUTPATIENT
Start: 2022-09-29

## 2022-10-04 ENCOUNTER — OFFICE VISIT (OUTPATIENT)
Dept: INTERNAL MEDICINE | Facility: CLINIC | Age: 32
End: 2022-10-04

## 2022-10-04 VITALS
RESPIRATION RATE: 18 BRPM | HEART RATE: 69 BPM | SYSTOLIC BLOOD PRESSURE: 130 MMHG | TEMPERATURE: 98 F | WEIGHT: 241 LBS | DIASTOLIC BLOOD PRESSURE: 72 MMHG | OXYGEN SATURATION: 98 % | HEIGHT: 70 IN | BODY MASS INDEX: 34.5 KG/M2

## 2022-10-04 DIAGNOSIS — K21.9 GASTROESOPHAGEAL REFLUX DISEASE WITHOUT ESOPHAGITIS: Chronic | ICD-10-CM

## 2022-10-04 DIAGNOSIS — Z23 NEED FOR VACCINATION: Primary | ICD-10-CM

## 2022-10-04 DIAGNOSIS — F41.9 ANXIETY: Chronic | ICD-10-CM

## 2022-10-04 DIAGNOSIS — Z00.00 ENCOUNTER FOR PREVENTIVE HEALTH EXAMINATION: ICD-10-CM

## 2022-10-04 DIAGNOSIS — I10 ESSENTIAL HYPERTENSION: Chronic | ICD-10-CM

## 2022-10-04 PROCEDURE — 99213 OFFICE O/P EST LOW 20 MIN: CPT | Performed by: INTERNAL MEDICINE

## 2022-10-04 NOTE — PROGRESS NOTES
Subjective   Primitivo Reeves is a 32 y.o. male.     Chief Complaint   Patient presents with   • Hypertension         Hypertension  This is a chronic problem. The current episode started more than 1 year ago. The problem is unchanged. Associated symptoms include anxiety. Pertinent negatives include no chest pain, palpitations, peripheral edema or shortness of breath.   Anxiety  Presents for follow-up visit. Symptoms include nervous/anxious behavior. Patient reports no chest pain, palpitations or shortness of breath.            The following portions of the patient's history were reviewed and updated as appropriate: allergies, current medications, past social history and problem list.    Outpatient Medications Marked as Taking for the 10/4/22 encounter (Office Visit) with Chicho Mckeon MD   Medication Sig Dispense Refill   • atenolol (TENORMIN) 50 MG tablet TAKE 1 TABLET BY MOUTH DAILY 90 tablet 1   • olmesartan (BENICAR) 20 MG tablet Take 0.5 tablets by mouth Daily. 45 tablet 1   • [DISCONTINUED] amoxicillin-clavulanate (Augmentin) 875-125 MG per tablet Take 1 tablet by mouth Every 12 (Twelve) Hours. 14 tablet 0       Review of Systems   Respiratory: Negative for shortness of breath.    Cardiovascular: Negative for chest pain and palpitations.   Gastrointestinal: Negative for abdominal pain.   Psychiatric/Behavioral: The patient is nervous/anxious.        Objective   Vitals:    10/04/22 1449   BP: 130/72   Pulse: 69   Resp: 18   Temp: 98 °F (36.7 °C)   SpO2: 98%          10/04/22  1449   Weight: 109 kg (241 lb)    [unfilled]  Body mass index is 34.58 kg/m².      Physical Exam   Cardiovascular: Normal rate, regular rhythm and normal heart sounds. Exam reveals no gallop.   No murmur heard.  Pulmonary/Chest: Effort normal and breath sounds normal. No respiratory distress. He has no wheezes. He has no rales.   Abdominal: Normal appearance.   Neurological: He is alert.         Problems Addressed this Visit         Cardiac and Vasculature    Essential hypertension (Chronic)       Gastrointestinal Abdominal     Gastroesophageal reflux disease without esophagitis (Chronic)       Mental Health    Anxiety (Chronic)      Other Visit Diagnoses     Need for vaccination    -  Primary      Diagnoses       Codes Comments    Need for vaccination    -  Primary ICD-10-CM: Z23  ICD-9-CM: V05.9     Essential hypertension     ICD-10-CM: I10  ICD-9-CM: 401.9     Gastroesophageal reflux disease without esophagitis     ICD-10-CM: K21.9  ICD-9-CM: 530.81     Anxiety     ICD-10-CM: F41.9  ICD-9-CM: 300.00         Assessment & Plan   In for recheck of hypertension, anxiety and ADD.  Anxiety is much improved since he got off of caffeine.  He is off of ADD medicine.  He's had some significant anxiety and panic in the past.  That's doing better.  Blood pressure is good today and he has been off of his Benicar for a while.  He is going to stay off and monitor at home.  He is advised that blood pressure may slowly rise off of that particular treatment.  He remains on atenolol 50 mg daily.  That is reviewed today.  He will got his annual lab work including CBC, CMP, lipids, UA and annual preventive exam April 2022.  We'll continue to monitor blood pressure at 6 month intervals.  He is off of alcohol which is soft a lot of his problems.  He has been using some cognitive behavioral techniques.  We will continue with those.  Right now he is doing much better.  Certainly he is under control.      The above information was reviewed again today 10/04/22.  It continues to be accurate as reflected above and is unchanged.  History, physical and review of systems all reviewed and are unchanged.  Medications were reviewed today and continue the current dosing.    PPE today includes face mask and eye shield.         Dragon disclaimer:   Much of this encounter note is an electronic transcription/translation of spoken language to printed text. The electronic  translation of spoken language may permit erroneous, or at times, nonsensical words or phrases to be inadvertently transcribed; Although I have reviewed the note for such errors, some may still exist.

## 2022-11-21 RX ORDER — ATENOLOL 50 MG/1
50 TABLET ORAL DAILY
Qty: 90 TABLET | Refills: 1 | Status: SHIPPED | OUTPATIENT
Start: 2022-11-21 | End: 2023-02-12 | Stop reason: SDUPTHER

## 2023-01-16 ENCOUNTER — TELEPHONE (OUTPATIENT)
Dept: INTERNAL MEDICINE | Facility: CLINIC | Age: 33
End: 2023-01-16

## 2023-01-16 NOTE — TELEPHONE ENCOUNTER
Called to schedule patient for video visit, patient declined next available appt. Stated will go to [facility].

## 2023-01-16 NOTE — TELEPHONE ENCOUNTER
Caller: Primitivo Reeves    Relationship: Self    Best call back number: [phone]    PATIENT HAS A SORE THROAT AND HIS DAUGHTER JUST TESTED POSITIVE FOR FLU.    [facility] WOULD LIKE TO KNOW IF AN ORDER COULD BE PLACED TO TEST FOR STREP WITHOUT BEING SEEN

## 2023-02-13 RX ORDER — ATENOLOL 50 MG/1
50 TABLET ORAL DAILY
Qty: 90 TABLET | Refills: 1 | Status: SHIPPED | OUTPATIENT
Start: 2023-02-13

## 2023-04-04 ENCOUNTER — OFFICE VISIT (OUTPATIENT)
Dept: INTERNAL MEDICINE | Facility: CLINIC | Age: 33
End: 2023-04-04
Payer: COMMERCIAL

## 2023-04-04 ENCOUNTER — LAB (OUTPATIENT)
Dept: LAB | Facility: HOSPITAL | Age: 33
End: 2023-04-04
Payer: COMMERCIAL

## 2023-04-04 VITALS
SYSTOLIC BLOOD PRESSURE: 122 MMHG | OXYGEN SATURATION: 98 % | HEART RATE: 67 BPM | HEIGHT: 70 IN | DIASTOLIC BLOOD PRESSURE: 80 MMHG | TEMPERATURE: 98.3 F | RESPIRATION RATE: 18 BRPM | BODY MASS INDEX: 35.07 KG/M2 | WEIGHT: 245 LBS

## 2023-04-04 DIAGNOSIS — K21.9 GASTROESOPHAGEAL REFLUX DISEASE WITHOUT ESOPHAGITIS: Chronic | ICD-10-CM

## 2023-04-04 DIAGNOSIS — F41.9 ANXIETY: Chronic | ICD-10-CM

## 2023-04-04 DIAGNOSIS — I10 ESSENTIAL HYPERTENSION: Chronic | ICD-10-CM

## 2023-04-04 DIAGNOSIS — Z00.00 ENCOUNTER FOR PREVENTIVE HEALTH EXAMINATION: Primary | ICD-10-CM

## 2023-04-04 PROCEDURE — 80061 LIPID PANEL: CPT | Performed by: INTERNAL MEDICINE

## 2023-04-04 PROCEDURE — 99395 PREV VISIT EST AGE 18-39: CPT | Performed by: INTERNAL MEDICINE

## 2023-04-04 PROCEDURE — 85025 COMPLETE CBC W/AUTO DIFF WBC: CPT | Performed by: INTERNAL MEDICINE

## 2023-04-04 PROCEDURE — 80053 COMPREHEN METABOLIC PANEL: CPT | Performed by: INTERNAL MEDICINE

## 2023-04-04 NOTE — PROGRESS NOTES
Subjective   Primitivo Reeves is a 32 y.o. male.     Chief Complaint   Patient presents with   • Annual Exam   • Hypertension   • ADD   • Hemorrhoids         History of Present Illness  In for annual preventative exam.  Sleeps 8 hours per night.  Sleep is good.  Exercises 3-4 days per week.  Energy is good.  Diet is balanced.  Hypertension  This is a chronic problem. The current episode started more than 1 year ago. The problem is unchanged. Pertinent negatives include no chest pain, headaches, palpitations or shortness of breath.   ADD  Associated symptoms include abdominal pain (gerd resolved with not eating late). Pertinent negatives include no arthralgias, chest pain, chills, coughing, diaphoresis, fatigue, fever, headaches, myalgias, nausea, vomiting or weakness.   Hemorrhoids  Associated symptoms include abdominal pain (gerd resolved with not eating late). Pertinent negatives include no arthralgias, chest pain, chills, coughing, diaphoresis, fatigue, fever, headaches, myalgias, nausea, vomiting or weakness.        The following portions of the patient's history were reviewed and updated as appropriate: allergies, current medications, past social history and problem list.    HISTORY  Outpatient Medications Marked as Taking for the 23 encounter (Office Visit) with Chicho Mckeon MD   Medication Sig Dispense Refill   • atenolol (TENORMIN) 50 MG tablet Take 1 tablet by mouth Daily. 90 tablet 1   • olmesartan (BENICAR) 20 MG tablet Take 0.5 tablets by mouth Daily. 45 tablet 1     Social History     Socioeconomic History   • Marital status:    Tobacco Use   • Smoking status: Former     Packs/day: 1.00     Years: 3.00     Pack years: 3.00     Types: Cigarettes     Start date:      Quit date:      Years since quittin.2   • Smokeless tobacco: Former     Types: Chew     Quit date:    Substance and Sexual Activity   • Alcohol use: Not Currently     Alcohol/week: 15.0 standard drinks      Types: 15 Cans of beer per week   • Drug use: Not Currently     Frequency: 5.0 times per week     Types: Marijuana     Family History   Problem Relation Age of Onset   • No Known Problems Mother    • No Known Problems Brother    • No Known Problems Son      Past Medical History:   Diagnosis Date   • ADD (attention deficit disorder)    • Anxiety, generalized    • AR (allergic rhinitis)    • GERD (gastroesophageal reflux disease)    • Hypertension      Past Surgical History:   Procedure Laterality Date   • PILONIDAL CYSTECTOMY         Review of Systems   Constitutional: Negative for chills, diaphoresis, fatigue, fever and unexpected weight change.   Respiratory: Negative for cough, chest tightness, shortness of breath and wheezing.    Cardiovascular: Negative for chest pain, palpitations and leg swelling.   Gastrointestinal: Positive for abdominal pain (gerd resolved with not eating late) and hemorrhoids. Negative for anal bleeding, blood in stool, constipation, diarrhea, nausea, rectal pain and vomiting.   Endocrine: Negative for cold intolerance, heat intolerance and polyuria.   Genitourinary: Negative for difficulty urinating, dysuria, frequency, hematuria and urgency.   Musculoskeletal: Negative for arthralgias, back pain and myalgias.   Allergic/Immunologic: Positive for environmental allergies.   Neurological: Negative for dizziness, syncope, weakness, light-headedness and headaches.   Hematological: Negative for adenopathy. Does not bruise/bleed easily.   Psychiatric/Behavioral: Negative for confusion, decreased concentration, dysphoric mood and sleep disturbance. The patient is not nervous/anxious.        Objective   Vitals:    04/04/23 1417   BP: 122/80   Pulse: 67   Resp: 18   Temp: 98.3 °F (36.8 °C)   SpO2: 98%          04/04/23  1417   Weight: 111 kg (245 lb)    [unfilled]  Body mass index is 35.15 kg/m².      Physical Exam   Constitutional: He is oriented to person, place, and time. He appears  well-developed.   HENT:   Head: Normocephalic and atraumatic.   Right Ear: External ear normal.   Left Ear: External ear normal.   Nose: Nose normal.   Eyes: Pupils are equal, round, and reactive to light. Conjunctivae are normal. No scleral icterus.   Neck: No JVD present. No thyromegaly present.   Cardiovascular: Normal rate, regular rhythm and normal heart sounds. Exam reveals no gallop and no friction rub.   No murmur heard.  Pulmonary/Chest: Effort normal and breath sounds normal. No respiratory distress. He has no wheezes. He has no rales.   Abdominal: Soft. Normal appearance and bowel sounds are normal. He exhibits no distension and no mass. There is no abdominal tenderness. There is no rebound and no guarding. No hernia.   Musculoskeletal: Normal range of motion.   Lymphadenopathy:     He has no cervical adenopathy.   Neurological: He is alert and oriented to person, place, and time. He has normal reflexes. He displays normal reflexes.   Skin: Skin is warm and dry.   Psychiatric: His behavior is normal. Mood, judgment and thought content normal.   Nursing note and vitals reviewed.        Problems Addressed this Visit        Cardiac and Vasculature    Essential hypertension (Chronic)       Gastrointestinal Abdominal     Gastroesophageal reflux disease without esophagitis (Chronic)       Mental Health    Anxiety (Chronic)   Other Visit Diagnoses     Encounter for preventive health examination    -  Primary      Diagnoses       Codes Comments    Encounter for preventive health examination    -  Primary ICD-10-CM: Z00.00  ICD-9-CM: V70.0     Essential hypertension     ICD-10-CM: I10  ICD-9-CM: 401.9     Gastroesophageal reflux disease without esophagitis     ICD-10-CM: K21.9  ICD-9-CM: 530.81     Anxiety     ICD-10-CM: F41.9  ICD-9-CM: 300.00         Assessment & Plan   In for annual preventive exam today April 2023.  Blood pressure is good today.  Previously well-controlled.  He is off of ADD medicine.  He's  had some significant anxiety and panic in the past.  That's doing better.  No anxiety at all since he quit caffeine.  He will get his annual lab work today, April 2023 including CBC, CMP, lipids, and UA.  We'll continue to monitor blood pressure at 6 month intervals.  He is otherwise up-to-date and doing well.  He needs to lose some weight.  He is fasting today.     Prevention counseling was performed today. The counseling performed was routine health maintenance topics including BMI and exercise.    The above information was reviewed again today 04/04/23.  It continues to be accurate as reflected above and is unchanged.  History, physical and review of systems all reviewed and are unchanged.  Medications were reviewed today and continue the current dosing.    PPE today includes face mask and eye shield.         Dragon disclaimer:   Much of this encounter note is an electronic transcription/translation of spoken language to printed text. The electronic translation of spoken language may permit erroneous, or at times, nonsensical words or phrases to be inadvertently transcribed; Although I have reviewed the note for such errors, some may still exist.

## 2023-05-25 RX ORDER — OLMESARTAN MEDOXOMIL 20 MG/1
10 TABLET ORAL DAILY
Qty: 45 TABLET | Refills: 1 | Status: SHIPPED | OUTPATIENT
Start: 2023-05-25

## 2023-08-16 ENCOUNTER — TELEPHONE (OUTPATIENT)
Dept: INTERNAL MEDICINE | Facility: CLINIC | Age: 33
End: 2023-08-16
Payer: COMMERCIAL

## 2023-08-16 RX ORDER — ATENOLOL 25 MG/1
25 TABLET ORAL DAILY
COMMUNITY

## 2023-08-16 NOTE — TELEPHONE ENCOUNTER
Pt c/o heart rate 55-60 usually, but feeling good. Standing up gets lightheaded. Very sporadic, not persistent. Blood pressure readings 117/77, some low bp 93/63 readings. Pt has lost weight at 225lbs, so BP has been improving. Wondering if he is taking too much BP medication. Please advise.

## 2023-08-16 NOTE — TELEPHONE ENCOUNTER
I am really not worried about his heart rate.  Nonetheless his blood pressure is running a little low and he is having some lightheadedness.  Those are likely from low blood pressure, not low heart rate.  Lets cut his atenolol in half to 25 mg daily.

## 2023-08-23 ENCOUNTER — OFFICE VISIT (OUTPATIENT)
Dept: INTERNAL MEDICINE | Facility: CLINIC | Age: 33
End: 2023-08-23
Payer: COMMERCIAL

## 2023-08-23 VITALS
HEIGHT: 70 IN | HEART RATE: 71 BPM | SYSTOLIC BLOOD PRESSURE: 124 MMHG | DIASTOLIC BLOOD PRESSURE: 78 MMHG | OXYGEN SATURATION: 98 % | WEIGHT: 228 LBS | BODY MASS INDEX: 32.64 KG/M2 | TEMPERATURE: 97.7 F | RESPIRATION RATE: 16 BRPM

## 2023-08-23 DIAGNOSIS — I10 ESSENTIAL HYPERTENSION: Primary | Chronic | ICD-10-CM

## 2023-08-23 DIAGNOSIS — B07.9 VIRAL WARTS, UNSPECIFIED TYPE: ICD-10-CM

## 2023-08-23 DIAGNOSIS — R10.9 ABDOMINAL PAIN, UNSPECIFIED ABDOMINAL LOCATION: ICD-10-CM

## 2023-08-23 DIAGNOSIS — F98.8 ATTENTION DEFICIT DISORDER (ADD) WITHOUT HYPERACTIVITY: Chronic | ICD-10-CM

## 2023-08-23 LAB
BILIRUB BLD-MCNC: ABNORMAL MG/DL
CLARITY, POC: CLEAR
COLOR UR: ABNORMAL
EXPIRATION DATE: ABNORMAL
GLUCOSE UR STRIP-MCNC: NEGATIVE MG/DL
KETONES UR QL: ABNORMAL
LEUKOCYTE EST, POC: NEGATIVE
Lab: ABNORMAL
NITRITE UR-MCNC: NEGATIVE MG/ML
PH UR: 5.5 [PH] (ref 5–8)
PROT UR STRIP-MCNC: ABNORMAL MG/DL
RBC # UR STRIP: NEGATIVE /UL
SP GR UR: 1.02 (ref 1–1.03)
UROBILINOGEN UR QL: NORMAL

## 2023-08-23 PROCEDURE — 99213 OFFICE O/P EST LOW 20 MIN: CPT | Performed by: INTERNAL MEDICINE

## 2023-08-23 PROCEDURE — 81003 URINALYSIS AUTO W/O SCOPE: CPT | Performed by: INTERNAL MEDICINE

## 2023-08-23 NOTE — PROGRESS NOTES
Subjective   Primitivo Reeves is a 33 y.o. male.     Chief Complaint   Patient presents with    Hypertension     One instance of low bp & dizziness x couple weeks back    GI Problem     Feels hungry & pain    Skin Problem     Right elbow warts         Hypertension  This is a chronic problem. The problem is unchanged. Pertinent negatives include no headaches.   GI Problem  Primary symptoms do not include abdominal pain or diarrhea. The illness began more than 7 days ago. The problem has not changed since onset.  The illness is also significant for anorexia. The illness does not include constipation.   Skin Problem  This is a recurrent problem. The current episode started 1 to 4 weeks ago. Associated symptoms include anorexia. Pertinent negatives include no abdominal pain or headaches.      The following portions of the patient's history were reviewed and updated as appropriate: allergies, current medications, past social history and problem list.    Outpatient Medications Marked as Taking for the 8/23/23 encounter (Office Visit) with Chicho Mckeon MD   Medication Sig Dispense Refill    atenolol (TENORMIN) 25 MG tablet Take 1 tablet by mouth Daily.      olmesartan (BENICAR) 20 MG tablet Take 0.5 tablets by mouth Daily. 45 tablet 1       Review of Systems   Cardiovascular:  Negative for leg swelling.   Gastrointestinal:  Positive for anorexia. Negative for abdominal pain, constipation and diarrhea.   Neurological:  Negative for headaches.     Objective   Vitals:    08/23/23 0753   BP: 124/78   Pulse: 71   Resp: 16   Temp: 97.7 øF (36.5 øC)   SpO2: 98%      Wt Readings from Last 3 Encounters:   08/23/23 103 kg (228 lb)   04/04/23 111 kg (245 lb)   10/04/22 109 kg (241 lb)    Body mass index is 32.71 kg/mý.      Physical Exam  Constitutional:       Appearance: Normal appearance.   Cardiovascular:      Rate and Rhythm: Normal rate and regular rhythm.      Heart sounds: Normal heart sounds. No murmur heard.     No gallop.   Pulmonary:      Effort: No respiratory distress.      Breath sounds: Normal breath sounds. No wheezing or rales.   Skin:     Findings: Rash present.   Neurological:      Mental Status: He is alert.         Problems Addressed this Visit          Cardiac and Vasculature    Essential hypertension - Primary (Chronic)       Mental Health    ADD (attention deficit disorder) (Chronic)     Diagnoses         Codes Comments    Essential hypertension    -  Primary ICD-10-CM: I10  ICD-9-CM: 401.9     Attention deficit disorder (ADD) without hyperactivity     ICD-10-CM: F98.8  ICD-9-CM: 314.00           Assessment & Plan   In today with 2 issues.  His blood pressure has had a few low readings but for the most part they are excellent.  Just 2 readings that were under 110.  Advised patient his blood pressure right now remains excellent despite his weight loss.  There is no need yet to back off on his medication but that time may come with his continued weight loss.  A second issue is some vague emptiness in the stomach.  Not quite a nauseated sensation.  More of an anorexia.  That seems to progress with his dieting.  I think that is just a normal response to dieting good.  Perhaps even some starvation ketosis.  We will check urinalysis today.  He has had a history of GERD that is pretty much resolved at this point but the symptoms could also be a symptom of gastritis.  In that regard they should respond nicely to a trial of Pepcid.  Lastly he has a rash on his right elbow.  This has been a recurrent problem.  A cluster of warts is present or warty type growth.  Will refer to dermatology for this.    The above information was reviewed again today 08/23/23.  It continues to be accurate as reflected above and is unchanged.  History, physical and review of systems all reviewed and are unchanged.  Medications were reviewed today and continue the current dosing.             Dragon disclaimer:   Part of this note may be an  electronic transcription/translation of spoken language to printed text using the Dragon Dictation System.

## 2023-10-06 ENCOUNTER — OFFICE VISIT (OUTPATIENT)
Dept: INTERNAL MEDICINE | Facility: CLINIC | Age: 33
End: 2023-10-06
Payer: COMMERCIAL

## 2023-10-06 VITALS
BODY MASS INDEX: 31.92 KG/M2 | OXYGEN SATURATION: 99 % | TEMPERATURE: 96.3 F | WEIGHT: 223 LBS | HEIGHT: 70 IN | RESPIRATION RATE: 16 BRPM | SYSTOLIC BLOOD PRESSURE: 115 MMHG | HEART RATE: 63 BPM | DIASTOLIC BLOOD PRESSURE: 81 MMHG

## 2023-10-06 DIAGNOSIS — I10 ESSENTIAL HYPERTENSION: Primary | Chronic | ICD-10-CM

## 2023-10-06 DIAGNOSIS — F98.8 ATTENTION DEFICIT DISORDER (ADD) WITHOUT HYPERACTIVITY: Chronic | ICD-10-CM

## 2023-10-06 PROCEDURE — 99213 OFFICE O/P EST LOW 20 MIN: CPT | Performed by: INTERNAL MEDICINE

## 2023-10-06 NOTE — PROGRESS NOTES
Subjective   Primitivo Reeves is a 33 y.o. male.     Chief Complaint   Patient presents with    Hypertension         Hypertension  This is a chronic problem. The current episode started more than 1 year ago. The problem is unchanged. Associated symptoms include anxiety. Pertinent negatives include no chest pain, palpitations, peripheral edema or shortness of breath.   Anxiety  Presents for follow-up visit. Symptoms include nervous/anxious behavior. Patient reports no chest pain, palpitations or shortness of breath.          The following portions of the patient's history were reviewed and updated as appropriate: allergies, current medications, past social history and problem list.    Outpatient Medications Marked as Taking for the 10/6/23 encounter (Office Visit) with Chicho Mckeon MD   Medication Sig Dispense Refill    atenolol (TENORMIN) 25 MG tablet Take 1 tablet by mouth Daily.      olmesartan (BENICAR) 20 MG tablet Take 0.5 tablets by mouth Daily. 45 tablet 1       Review of Systems   Respiratory:  Negative for shortness of breath.    Cardiovascular:  Negative for chest pain and palpitations.   Gastrointestinal:  Negative for abdominal pain.   Psychiatric/Behavioral:  The patient is nervous/anxious.      Objective   Vitals:    10/06/23 1354   BP: 115/81   Pulse: 63   Resp: 16   Temp: 96.3 °F (35.7 °C)   SpO2: 99%          10/06/23  1354   Weight: 101 kg (223 lb)    [unfilled]  Body mass index is 32 kg/m².      Physical Exam   Cardiovascular: Normal rate, regular rhythm and normal heart sounds. Exam reveals no gallop.   No murmur heard.  Pulmonary/Chest: Effort normal and breath sounds normal. No respiratory distress. He has no wheezes. He has no rales.   Abdominal: Normal appearance.   Neurological: He is alert.       Problems Addressed this Visit          Cardiac and Vasculature    Essential hypertension - Primary (Chronic)       Mental Health    ADD (attention deficit disorder) (Chronic)      Diagnoses         Codes Comments    Essential hypertension    -  Primary ICD-10-CM: I10  ICD-9-CM: 401.9     Attention deficit disorder (ADD) without hyperactivity     ICD-10-CM: F98.8  ICD-9-CM: 314.00           Assessment & Plan   In for recheck of hypertension, anxiety and ADD today October 2023.  Anxiety is much improved since he got off of caffeine.  He is off of ADD medicine.  He's had some significant anxiety and panic in the past.  That's doing better.  Blood pressure is good today and he has been back on Benicar 10 mg daily.  That is reviewed today.  He got his annual lab work April 2023 including CBC, CMP, lipids, UA.  We will get annual preventive exam April 2023.  We'll continue to monitor blood pressure at 6 month intervals.  He is off of alcohol which is soft a lot of his problems.  He has been using some cognitive behavioral techniques.      The above information was reviewed again today 10/06/23.  It continues to be accurate as reflected above and is unchanged.  History, physical and review of systems all reviewed and are unchanged.  Medications were reviewed today and continue the current dosing.         Dragon disclaimer:   Much of this encounter note is an electronic transcription/translation of spoken language to printed text. The electronic translation of spoken language may permit erroneous, or at times, nonsensical words or phrases to be inadvertently transcribed; Although I have reviewed the note for such errors, some may still exist.

## 2023-11-16 ENCOUNTER — TELEPHONE (OUTPATIENT)
Dept: INTERNAL MEDICINE | Facility: CLINIC | Age: 33
End: 2023-11-16
Payer: COMMERCIAL

## 2023-11-16 RX ORDER — ATENOLOL 25 MG/1
25 TABLET ORAL DAILY
Qty: 90 TABLET | Refills: 1 | Status: SHIPPED | OUTPATIENT
Start: 2023-11-16 | End: 2023-11-16

## 2023-11-16 RX ORDER — ATENOLOL 50 MG/1
50 TABLET ORAL DAILY
Qty: 90 TABLET | Refills: 1 | OUTPATIENT
Start: 2023-11-16

## 2023-11-16 RX ORDER — ATENOLOL 50 MG/1
50 TABLET ORAL DAILY
Qty: 90 TABLET | Refills: 1 | Status: SHIPPED | OUTPATIENT
Start: 2023-11-16

## 2023-11-16 NOTE — TELEPHONE ENCOUNTER
Caller: Primitivo Reeves    Relationship: Self    Best call back number: [phone]     What is the best time to reach you: ANYTIME BETWEEN 2PM-3PM     Who are you requesting to speak with (clinical staff, provider,  specific staff member): CLINICAL    Do you know the name of the person who called: WINSOME     What was the call regarding: PATIENT RETURNING CALL IN REGARDS TO HIS MEDICATION     Is it okay if the provider responds through MyChart: NO

## 2023-11-16 NOTE — TELEPHONE ENCOUNTER
Spoke to patient today & he confirmed he still taking 50 mg tab. He never adjusted to 25 mg tablet per August telephone encounter. Will send 50 mg. Cancelled 25 mg tab with pharmacy today.

## 2023-11-30 ENCOUNTER — TELEMEDICINE (OUTPATIENT)
Dept: INTERNAL MEDICINE | Facility: CLINIC | Age: 33
End: 2023-11-30
Payer: COMMERCIAL

## 2023-11-30 DIAGNOSIS — I10 ESSENTIAL HYPERTENSION: Primary | Chronic | ICD-10-CM

## 2023-11-30 DIAGNOSIS — R53.83 OTHER FATIGUE: ICD-10-CM

## 2023-11-30 PROCEDURE — 99214 OFFICE O/P EST MOD 30 MIN: CPT | Performed by: INTERNAL MEDICINE

## 2023-11-30 RX ORDER — SERTRALINE HYDROCHLORIDE 25 MG/1
25 TABLET, FILM COATED ORAL DAILY
Qty: 30 TABLET | Refills: 2 | Status: SHIPPED | OUTPATIENT
Start: 2023-11-30

## 2023-11-30 NOTE — PROGRESS NOTES
Subjective   Primitivo Reeves is a 33 y.o. male.     Chief Complaint   Patient presents with    Fatigue         Fatigue  This is a new problem. The current episode started 1 to 4 weeks ago. The problem occurs constantly. Associated symptoms include fatigue. Pertinent negatives include no chest pain, chills, coughing or fever.        The following portions of the patient's history were reviewed and updated as appropriate: allergies, current medications, past social history and problem list.    Outpatient Medications Marked as Taking for the 11/30/23 encounter (Telemedicine) with Chicho Mckeon MD   Medication Sig Dispense Refill    atenolol (TENORMIN) 50 MG tablet Take 1 tablet by mouth Daily. 90 tablet 1    olmesartan (BENICAR) 20 MG tablet Take 0.5 tablets by mouth Daily. 45 tablet 1       Review of Systems   Constitutional:  Positive for fatigue. Negative for chills and fever.   Respiratory:  Negative for cough and shortness of breath.    Cardiovascular:  Negative for chest pain, palpitations and leg swelling.       Objective   There were no vitals filed for this visit.   Wt Readings from Last 3 Encounters:   10/06/23 101 kg (223 lb)   08/23/23 103 kg (228 lb)   04/04/23 111 kg (245 lb)    There is no height or weight on file to calculate BMI.      Physical Exam  Constitutional:       Appearance: Normal appearance.   Pulmonary:      Effort: Pulmonary effort is normal.   Neurological:      Mental Status: He is alert.   Psychiatric:         Mood and Affect: Mood normal.         Thought Content: Thought content normal.         Judgment: Judgment normal.           Problems Addressed this Visit          Cardiac and Vasculature    Essential hypertension - Primary (Chronic)     Other Visit Diagnoses       Other fatigue              Diagnoses         Codes Comments    Essential hypertension    -  Primary ICD-10-CM: I10  ICD-9-CM: 401.9     Other fatigue     ICD-10-CM: R53.83  ICD-9-CM: 780.79            Assessment & Plan   Video visit today due to COVID pandemic.  He just has not felt well in the last few weeks.  He has had fatigue.  Just does not feel right.  Cannot figure out why.  He checks his blood sugar which has been fine.  Checked his blood pressure which has been fine.  He previously stopped his Benicar due to some low blood pressures but then resumed it.  He wonders if the Benicar is a problem and I suggest that is probably not.  As we discussed things further it sounds like most of his symptoms are anxiety related.  He has been taking marijuana on a chronic basis to help control his anxiety.  He uses some sort of extraction device and right now is taking 1 small head in the evening when he gets home from work.  He previously took more and took it several times daily.  We discussed the use of marijuana for anxiety treatment versus an SSRI.  He tried Wellbutrin once and did not like it.  Will place on Zoloft 25 mg daily and see how he does.  Recheck in 1 month.  Advised to keep his marijuana dose low and perhaps the Zoloft will replace the marijuana for him.  He does sound like an ideal candidate for chronic use of marijuana for anxiety as long as he can keep the dose tightly controlled.  [employer] platform used for the visit today.    The above information was reviewed again today 11/30/23.  It continues to be accurate as reflected above and is unchanged.  History, physical and review of systems all reviewed and are unchanged.  Medications were reviewed today and continue the current dosing.               Dragon disclaimer:   Part of this note may be an electronic transcription/translation of spoken language to printed text using the Dragon Dictation System.

## 2023-12-15 RX ORDER — OLMESARTAN MEDOXOMIL 20 MG/1
10 TABLET ORAL DAILY
Qty: 45 TABLET | Refills: 0 | Status: SHIPPED | OUTPATIENT
Start: 2023-12-15

## 2023-12-29 ENCOUNTER — TELEMEDICINE (OUTPATIENT)
Dept: INTERNAL MEDICINE | Facility: CLINIC | Age: 33
End: 2023-12-29
Payer: COMMERCIAL

## 2023-12-29 DIAGNOSIS — I10 ESSENTIAL HYPERTENSION: Primary | Chronic | ICD-10-CM

## 2023-12-29 DIAGNOSIS — R25.2 MUSCLE CRAMPS: ICD-10-CM

## 2023-12-29 PROCEDURE — 99214 OFFICE O/P EST MOD 30 MIN: CPT | Performed by: INTERNAL MEDICINE

## 2023-12-29 NOTE — PROGRESS NOTES
Subjective   Primitivo Reeves is a 33 y.o. male.     Chief Complaint   Patient presents with    Anxiety    Fatigue         Anxiety  Presents for follow-up visit. Symptoms include nervous/anxious behavior. Patient reports no chest pain, palpitations or shortness of breath.       Fatigue  This is a new problem. The current episode started more than 1 month ago. The problem occurs constantly. Associated symptoms include fatigue. Pertinent negatives include no chest pain, chills, coughing or fever.        The following portions of the patient's history were reviewed and updated as appropriate: allergies, current medications, past social history and problem list.    No outpatient medications have been marked as taking for the 12/29/23 encounter (Telemedicine) with Chicho Mckeon MD.       Review of Systems   Constitutional:  Positive for fatigue. Negative for chills and fever.   Respiratory:  Negative for cough and shortness of breath.    Cardiovascular:  Negative for chest pain, palpitations and leg swelling.   Psychiatric/Behavioral:  The patient is nervous/anxious.        Objective   There were no vitals filed for this visit.   Wt Readings from Last 3 Encounters:   10/06/23 101 kg (223 lb)   08/23/23 103 kg (228 lb)   04/04/23 111 kg (245 lb)    There is no height or weight on file to calculate BMI.      Physical Exam  Constitutional:       Appearance: Normal appearance.   Pulmonary:      Effort: Pulmonary effort is normal.   Neurological:      Mental Status: He is alert.   Psychiatric:         Mood and Affect: Mood normal.         Thought Content: Thought content normal.         Judgment: Judgment normal.           Problems Addressed this Visit    None  Diagnoses    None.       Assessment & Plan   Video visit today due to COVID pandemic.  He has not felt well in the past 6 weeks or so since early October 2023.  He has had fatigue.  Not so much fatigue but leg cramps with exercise.  Cannot figure out why.   He checks his blood sugar which has been fine.  Checked his blood pressure which has been fine.  He previously stopped his Benicar due to some low blood pressures but then resumed it.  As we discussed things further it sounds like most of his symptoms are anxiety related.  He has been taking marijuana on a chronic basis to help control his anxiety.  He uses some sort of extraction device and right now is taking 1 small hit in the evening when he gets home from work.  He previously took more and took it several times daily.  He has virtually been off of all marijuana in the past month.  All of tobacco products have been discontinued in the past month.  We discussed the use of marijuana for anxiety treatment versus an SSRI.  He tried Wellbutrin once and did not like it.  Will tried him on on Zoloft 25 mg daily and see how he does.  Will go ahead and check some screening lab work including CBC, CMP, lipids, CPK, sed rate, TSH and free T4 to help seal the deal about any metabolic disturbance [employer] platform used for the visit today.    The above information was reviewed again today 12/29/23.  It continues to be accurate as reflected above and is unchanged.  History, physical and review of systems all reviewed and are unchanged.  Medications were reviewed today and continue the current dosing.             Dragon disclaimer:   Part of this note may be an electronic transcription/translation of spoken language to printed text using the Dragon Dictation System.

## 2023-12-31 LAB
ALBUMIN SERPL-MCNC: 4.9 G/DL (ref 4.1–5.1)
ALBUMIN/GLOB SERPL: 1.9 {RATIO} (ref 1.2–2.2)
ALP SERPL-CCNC: 59 IU/L (ref 44–121)
ALT SERPL-CCNC: 60 IU/L (ref 0–44)
AST SERPL-CCNC: 23 IU/L (ref 0–40)
BASOPHILS # BLD AUTO: 0 X10E3/UL (ref 0–0.2)
BASOPHILS NFR BLD AUTO: 0 %
BILIRUB SERPL-MCNC: 0.8 MG/DL (ref 0–1.2)
BUN SERPL-MCNC: 17 MG/DL (ref 6–20)
BUN/CREAT SERPL: 17 (ref 9–20)
CALCIUM SERPL-MCNC: 10.3 MG/DL (ref 8.7–10.2)
CHLORIDE SERPL-SCNC: 98 MMOL/L (ref 96–106)
CHOLEST SERPL-MCNC: 192 MG/DL (ref 100–199)
CHOLEST/HDLC SERPL: 3.6 RATIO (ref 0–5)
CK SERPL-CCNC: 134 U/L (ref 49–439)
CO2 SERPL-SCNC: 24 MMOL/L (ref 20–29)
CREAT SERPL-MCNC: 1.02 MG/DL (ref 0.76–1.27)
EGFRCR SERPLBLD CKD-EPI 2021: 98 ML/MIN/1.73
EOSINOPHIL # BLD AUTO: 0.1 X10E3/UL (ref 0–0.4)
EOSINOPHIL NFR BLD AUTO: 1 %
ERYTHROCYTE [DISTWIDTH] IN BLOOD BY AUTOMATED COUNT: 12.5 % (ref 11.6–15.4)
ERYTHROCYTE [SEDIMENTATION RATE] IN BLOOD BY WESTERGREN METHOD: 5 MM/HR (ref 0–15)
GLOBULIN SER CALC-MCNC: 2.6 G/DL (ref 1.5–4.5)
GLUCOSE SERPL-MCNC: 96 MG/DL (ref 70–99)
HCT VFR BLD AUTO: 46.6 % (ref 37.5–51)
HDLC SERPL-MCNC: 54 MG/DL
HGB BLD-MCNC: 15.9 G/DL (ref 13–17.7)
IMM GRANULOCYTES # BLD AUTO: 0 X10E3/UL (ref 0–0.1)
IMM GRANULOCYTES NFR BLD AUTO: 0 %
LDLC SERPL CALC-MCNC: 124 MG/DL (ref 0–99)
LYMPHOCYTES # BLD AUTO: 2.6 X10E3/UL (ref 0.7–3.1)
LYMPHOCYTES NFR BLD AUTO: 35 %
MCH RBC QN AUTO: 30 PG (ref 26.6–33)
MCHC RBC AUTO-ENTMCNC: 34.1 G/DL (ref 31.5–35.7)
MCV RBC AUTO: 88 FL (ref 79–97)
MONOCYTES # BLD AUTO: 0.7 X10E3/UL (ref 0.1–0.9)
MONOCYTES NFR BLD AUTO: 10 %
NEUTROPHILS # BLD AUTO: 3.9 X10E3/UL (ref 1.4–7)
NEUTROPHILS NFR BLD AUTO: 54 %
PLATELET # BLD AUTO: 285 X10E3/UL (ref 150–450)
POTASSIUM SERPL-SCNC: 4.2 MMOL/L (ref 3.5–5.2)
PROT SERPL-MCNC: 7.5 G/DL (ref 6–8.5)
RBC # BLD AUTO: 5.3 X10E6/UL (ref 4.14–5.8)
SODIUM SERPL-SCNC: 138 MMOL/L (ref 134–144)
T4 FREE SERPL-MCNC: 1.4 NG/DL (ref 0.82–1.77)
TRIGL SERPL-MCNC: 79 MG/DL (ref 0–149)
TSH SERPL DL<=0.005 MIU/L-ACNC: 2.29 UIU/ML (ref 0.45–4.5)
VLDLC SERPL CALC-MCNC: 14 MG/DL (ref 5–40)
WBC # BLD AUTO: 7.3 X10E3/UL (ref 3.4–10.8)

## 2024-01-25 ENCOUNTER — TELEMEDICINE (OUTPATIENT)
Dept: INTERNAL MEDICINE | Facility: CLINIC | Age: 34
End: 2024-01-25
Payer: COMMERCIAL

## 2024-01-25 DIAGNOSIS — T73.3XXA FATIGUE DUE TO EXCESSIVE EXERTION, INITIAL ENCOUNTER: Primary | ICD-10-CM

## 2024-01-25 NOTE — PROGRESS NOTES
Subjective   Primitivo Reeves is a 33 y.o. male.     Chief Complaint   Patient presents with    Fatigue     Previous Covid Infection            Fatigue  This is a new problem. The current episode started more than 1 month ago. The problem occurs intermittently. The problem has been waxing and waning. Associated symptoms include fatigue and headaches. Pertinent negatives include no chills or fever.        The following portions of the patient's history were reviewed and updated as appropriate: allergies, current medications, past social history and problem list.    Outpatient Medications Marked as Taking for the 1/25/24 encounter (Telemedicine) with Chicho Mckeon MD   Medication Sig Dispense Refill    atenolol (TENORMIN) 50 MG tablet Take 1 tablet by mouth Daily. 90 tablet 1    olmesartan (BENICAR) 20 MG tablet TAKE 1/2 TABLET BY MOUTH EVERY DAY 45 tablet 0       Review of Systems   Constitutional:  Positive for fatigue. Negative for chills, fever and unexpected weight change.   Respiratory:  Negative for shortness of breath and wheezing.    Cardiovascular:  Positive for palpitations. Negative for leg swelling.   Neurological:  Positive for dizziness and headaches.       Objective   There were no vitals filed for this visit.   Wt Readings from Last 3 Encounters:   10/06/23 101 kg (223 lb)   08/23/23 103 kg (228 lb)   04/04/23 111 kg (245 lb)    There is no height or weight on file to calculate BMI.      Physical Exam  Constitutional:       Appearance: Normal appearance.   Pulmonary:      Effort: Pulmonary effort is normal.   Neurological:      Mental Status: He is alert.   Psychiatric:         Mood and Affect: Mood normal.         Behavior: Behavior normal.         Thought Content: Thought content normal.         Judgment: Judgment normal.           Problems Addressed this Visit    None  Visit Diagnoses       Fatigue due to excessive exertion, initial encounter    -  Primary          Diagnoses          Codes Comments    Fatigue due to excessive exertion, initial encounter    -  Primary ICD-10-CM: T73.3XXA  ICD-9-CM: 994.5           Assessment & Plan   Video visit today due to COVID pandemic.  He complains of fatigue.  This has been going on for 2 months.  Started following a bout of COVID.  He had lab work 1 month ago that failed to reveal a cause of his symptoms.  He has a mild ALT and calcium elevation at that time.  Will repeat a CMP now.  Also check an EKG.  Thyroid functions were also done 1 month ago.  His Apple Watch is saying that he is got low heart rate variability which worries him.  I am not exactly sure what the significance of that would be.  He is currently completely off of marijuana in all forms including Gummies for about 2 weeks.  No significant alcohol intake.    The above information was reviewed again today 01/25/24.  It continues to be accurate as reflected above and is unchanged.  History, physical and review of systems all reviewed and are unchanged.  Medications were reviewed today and continue the current dosing.             Dragon disclaimer:   Part of this note may be an electronic transcription/translation of spoken language to printed text using the Dragon Dictation System.

## 2024-01-31 ENCOUNTER — TELEPHONE (OUTPATIENT)
Dept: INTERNAL MEDICINE | Facility: CLINIC | Age: 34
End: 2024-01-31
Payer: COMMERCIAL

## 2024-01-31 DIAGNOSIS — T73.3XXA FATIGUE DUE TO EXCESSIVE EXERTION, INITIAL ENCOUNTER: Primary | ICD-10-CM

## 2024-01-31 NOTE — TELEPHONE ENCOUNTER
PATIENT CALLED REQUESTING ADDITIONAL LABS FOR PARATHYROID    HE IS WANTING TO GET THIS DONE AT SAME TIME OF OTHER LAB ORDER    CALL BACK NUMBER [phone]

## 2024-02-04 LAB
ALBUMIN SERPL-MCNC: 4.8 G/DL (ref 4.1–5.1)
ALBUMIN/GLOB SERPL: 1.9 {RATIO} (ref 1.2–2.2)
ALP SERPL-CCNC: 60 IU/L (ref 44–121)
ALT SERPL-CCNC: 44 IU/L (ref 0–44)
AST SERPL-CCNC: 23 IU/L (ref 0–40)
BILIRUB SERPL-MCNC: 0.6 MG/DL (ref 0–1.2)
BUN SERPL-MCNC: 14 MG/DL (ref 6–20)
BUN/CREAT SERPL: 15 (ref 9–20)
CALCIUM SERPL-MCNC: 9.6 MG/DL (ref 8.7–10.2)
CHLORIDE SERPL-SCNC: 99 MMOL/L (ref 96–106)
CO2 SERPL-SCNC: 25 MMOL/L (ref 20–29)
CREAT SERPL-MCNC: 0.94 MG/DL (ref 0.76–1.27)
EGFRCR SERPLBLD CKD-EPI 2021: 108 ML/MIN/1.73
GLOBULIN SER CALC-MCNC: 2.5 G/DL (ref 1.5–4.5)
GLUCOSE SERPL-MCNC: 90 MG/DL (ref 70–99)
POTASSIUM SERPL-SCNC: 4.3 MMOL/L (ref 3.5–5.2)
PROT SERPL-MCNC: 7.3 G/DL (ref 6–8.5)
PTH-INTACT SERPL-MCNC: 23 PG/ML (ref 15–65)
SODIUM SERPL-SCNC: 139 MMOL/L (ref 134–144)

## 2024-03-11 RX ORDER — OLMESARTAN MEDOXOMIL 20 MG/1
10 TABLET ORAL DAILY
Qty: 45 TABLET | Refills: 0 | Status: SHIPPED | OUTPATIENT
Start: 2024-03-11

## 2024-03-19 ENCOUNTER — OFFICE VISIT (OUTPATIENT)
Dept: INTERNAL MEDICINE | Facility: CLINIC | Age: 34
End: 2024-03-19
Payer: COMMERCIAL

## 2024-03-19 VITALS
TEMPERATURE: 98 F | SYSTOLIC BLOOD PRESSURE: 114 MMHG | HEART RATE: 67 BPM | OXYGEN SATURATION: 98 % | WEIGHT: 230 LBS | DIASTOLIC BLOOD PRESSURE: 72 MMHG | HEIGHT: 60 IN | RESPIRATION RATE: 18 BRPM | BODY MASS INDEX: 45.16 KG/M2

## 2024-03-19 DIAGNOSIS — I10 ESSENTIAL HYPERTENSION: Primary | Chronic | ICD-10-CM

## 2024-03-19 DIAGNOSIS — F41.9 ANXIETY: Chronic | ICD-10-CM

## 2024-03-19 DIAGNOSIS — K21.9 GASTROESOPHAGEAL REFLUX DISEASE WITHOUT ESOPHAGITIS: Chronic | ICD-10-CM

## 2024-03-19 PROCEDURE — 99213 OFFICE O/P EST LOW 20 MIN: CPT | Performed by: INTERNAL MEDICINE

## 2024-03-19 NOTE — PROGRESS NOTES
Subjective   Primitivo Reeves is a 33 y.o. male.     Chief Complaint   Patient presents with    Hypertension    Hyperlipidemia    Hyperglycemia    Back Pain         Hypertension  This is a chronic problem. The current episode started more than 1 year ago. The problem is unchanged. Pertinent negatives include no chest pain, palpitations, peripheral edema or shortness of breath.   Back Pain  This is a new problem. The current episode started more than 1 month ago. The problem occurs constantly. The problem is unchanged. The pain is present in the lumbar spine. Pertinent negatives include no abdominal pain or chest pain.   Anxiety  Presents for follow-up visit. Patient reports no chest pain, palpitations or shortness of breath.            The following portions of the patient's history were reviewed and updated as appropriate: allergies, current medications, past social history and problem list.    Outpatient Medications Marked as Taking for the 3/19/24 encounter (Office Visit) with Chicho Mckeon MD   Medication Sig Dispense Refill    atenolol (TENORMIN) 50 MG tablet Take 1 tablet by mouth Daily. 90 tablet 1    olmesartan (BENICAR) 20 MG tablet Take 0.5 tablets by mouth Daily. 45 tablet 0       Review of Systems   Respiratory:  Negative for cough, shortness of breath and wheezing.    Cardiovascular:  Negative for chest pain, palpitations and leg swelling.   Gastrointestinal:  Negative for abdominal pain, constipation and diarrhea.   Musculoskeletal:  Positive for back pain.       Objective   Vitals:    03/19/24 1522   BP: 114/72   Pulse: 67   Resp: 18   Temp: 98 °F (36.7 °C)   SpO2: 98%          03/19/24  1522   Weight: 104 kg (230 lb)    [unfilled]  Body mass index is 212.91 kg/m².      Physical Exam   Cardiovascular: Normal rate, regular rhythm and normal heart sounds. Exam reveals no gallop.   No murmur heard.  Pulmonary/Chest: Effort normal. No respiratory distress.   Abdominal: Normal appearance.    Neurological: He is alert.         Problems Addressed this Visit          Cardiac and Vasculature    Essential hypertension - Primary (Chronic)       Gastrointestinal Abdominal     Gastroesophageal reflux disease without esophagitis (Chronic)       Mental Health    Anxiety (Chronic)     Diagnoses         Codes Comments    Essential hypertension    -  Primary ICD-10-CM: I10  ICD-9-CM: 401.9     Gastroesophageal reflux disease without esophagitis     ICD-10-CM: K21.9  ICD-9-CM: 530.81     Anxiety     ICD-10-CM: F41.9  ICD-9-CM: 300.00           Assessment & Plan   For recheck of hypertension, anxiety and ADD today March 2024.  Anxiety is much improved since he got off of caffeine.  He is off of ADD medicine.  He's had some significant anxiety and panic in the past.  That's doing better.  Blood pressure is good today and he has been back on Benicar 10 mg daily.  That is reviewed today.  He got his annual lab work December 2023 including CBC, CMP, lipids, UA.  We will get annual preventive exam September 2024.  We'll continue to monitor blood pressure at 6 month intervals.  He is off of alcohol which is fixed a lot of his problems.  He has been using some cognitive behavioral techniques.      The above information was reviewed again today 03/19/24.  It continues to be accurate as reflected above and is unchanged.  History, physical and review of systems all reviewed and are unchanged.  Medications were reviewed today and continue the current dosing.           Luciano disclaimer:   Much of this encounter note is an electronic transcription/translation of spoken language to printed text. The electronic translation of spoken language may permit erroneous, or at times, nonsensical words or phrases to be inadvertently transcribed; Although I have reviewed the note for such errors, some may still exist.

## 2024-06-10 RX ORDER — OLMESARTAN MEDOXOMIL 20 MG/1
10 TABLET ORAL DAILY
Qty: 45 TABLET | Refills: 1 | Status: SHIPPED | OUTPATIENT
Start: 2024-06-10

## 2024-08-20 RX ORDER — ATENOLOL 50 MG/1
50 TABLET ORAL DAILY
Qty: 90 TABLET | Refills: 1 | Status: SHIPPED | OUTPATIENT
Start: 2024-08-20

## 2024-09-16 ENCOUNTER — OFFICE VISIT (OUTPATIENT)
Dept: INTERNAL MEDICINE | Facility: CLINIC | Age: 34
End: 2024-09-16
Payer: COMMERCIAL

## 2024-09-16 VITALS
SYSTOLIC BLOOD PRESSURE: 108 MMHG | WEIGHT: 218 LBS | RESPIRATION RATE: 16 BRPM | TEMPERATURE: 98 F | HEIGHT: 70 IN | OXYGEN SATURATION: 99 % | BODY MASS INDEX: 31.21 KG/M2 | DIASTOLIC BLOOD PRESSURE: 68 MMHG | HEART RATE: 60 BPM

## 2024-09-16 DIAGNOSIS — K21.9 GASTROESOPHAGEAL REFLUX DISEASE WITHOUT ESOPHAGITIS: Chronic | ICD-10-CM

## 2024-09-16 DIAGNOSIS — Z00.00 ENCOUNTER FOR PREVENTIVE HEALTH EXAMINATION: Primary | ICD-10-CM

## 2024-09-16 DIAGNOSIS — I10 ESSENTIAL HYPERTENSION: Chronic | ICD-10-CM

## 2024-09-16 PROCEDURE — 99395 PREV VISIT EST AGE 18-39: CPT | Performed by: INTERNAL MEDICINE

## 2024-09-16 RX ORDER — OLMESARTAN MEDOXOMIL 5 MG/1
5 TABLET ORAL DAILY
Qty: 90 TABLET | Refills: 1 | Status: SHIPPED | OUTPATIENT
Start: 2024-09-16

## 2024-12-03 ENCOUNTER — TELEMEDICINE (OUTPATIENT)
Dept: INTERNAL MEDICINE | Facility: CLINIC | Age: 34
End: 2024-12-03
Payer: COMMERCIAL

## 2024-12-03 DIAGNOSIS — J06.9 VIRAL UPPER RESPIRATORY TRACT INFECTION: Primary | ICD-10-CM

## 2024-12-03 PROCEDURE — 99213 OFFICE O/P EST LOW 20 MIN: CPT | Performed by: INTERNAL MEDICINE

## 2024-12-03 RX ORDER — FLUTICASONE FUROATE AND VILANTEROL 100; 25 UG/1; UG/1
1 POWDER RESPIRATORY (INHALATION)
Qty: 60 EACH | Refills: 1 | Status: SHIPPED | OUTPATIENT
Start: 2024-12-03

## 2024-12-03 NOTE — PROGRESS NOTES
Mode of Visit: Video  Location of patient: home  Location of provider: [facility]  You have chosen to receive care through a telehealth visit.  Does the patient consent to use a video/audio connection for your medical care today? Yes  The visit included audio and video interaction. No technical issues occurred during this visit.          Subjective   Primitivo Reeves is a 34 y.o. male.     Chief Complaint   Patient presents with    URI         URI   This is a new problem. The current episode started 1 to 4 weeks ago. Associated symptoms include coughing and rhinorrhea.        The following portions of the patient's history were reviewed and updated as appropriate: allergies, current medications, past social history and problem list.    No outpatient medications have been marked as taking for the 12/3/24 encounter (Telemedicine) with Chicho Mckeon MD.       Review of Systems   Constitutional:  Positive for fatigue. Negative for chills and fever.   HENT:  Positive for rhinorrhea and voice change.    Respiratory:  Positive for cough.        Objective   There were no vitals filed for this visit.   Wt Readings from Last 3 Encounters:   09/16/24 98.9 kg (218 lb)   03/19/24 104 kg (230 lb)   10/06/23 101 kg (223 lb)    There is no height or weight on file to calculate BMI.      Physical Exam  Constitutional:       Appearance: Normal appearance.   Pulmonary:      Effort: Pulmonary effort is normal.   Neurological:      Mental Status: He is alert.   Psychiatric:         Mood and Affect: Mood normal.         Behavior: Behavior normal.         Thought Content: Thought content normal.         Judgment: Judgment normal.           Problems Addressed this Visit    None  Visit Diagnoses       Viral upper respiratory tract infection    -  Primary          Diagnoses         Codes Comments    Viral upper respiratory tract infection    -  Primary ICD-10-CM: J06.9  ICD-9-CM: 465.9           Assessment & Plan   Video visit today.   2 weeks ago he had loss of voice and head congestion and cough.  Given a steroid pack in the immediate care center.  He felt reasonably better but in the past few days he is having significant problems with exercise induced cough and tightness in the chest with exercise.  It sounds like he is got some mild exercise-induced asthma, likely provoked by recent viral illness.  Will add some breo one puff daily.  2 daughters have both had pneumonia in the last week but it sounds like their illnesses are different than his.  I do not think we need to pursue this but if his symptoms worsen we can always get a chest x-ray, CBC and mycoplasma titer.  There has been a lot of mycoplasma in the community.  [employer] platform used for the visit today.    The above information was reviewed again today 12/03/24.  It continues to be accurate as reflected above and is unchanged.  History, physical and review of systems all reviewed and are unchanged.  Medications were reviewed today and continue the current dosing.             Dragon disclaimer:   Part of this note may be an electronic transcription/translation of spoken language to printed text using the Dragon Dictation System.

## 2025-02-26 RX ORDER — ATENOLOL 50 MG/1
50 TABLET ORAL DAILY
Qty: 90 TABLET | Refills: 1 | Status: SHIPPED | OUTPATIENT
Start: 2025-02-26

## 2025-03-17 ENCOUNTER — OFFICE VISIT (OUTPATIENT)
Dept: INTERNAL MEDICINE | Facility: CLINIC | Age: 35
End: 2025-03-17
Payer: COMMERCIAL

## 2025-03-17 VITALS
BODY MASS INDEX: 30.78 KG/M2 | RESPIRATION RATE: 18 BRPM | HEART RATE: 56 BPM | TEMPERATURE: 98.1 F | DIASTOLIC BLOOD PRESSURE: 74 MMHG | SYSTOLIC BLOOD PRESSURE: 130 MMHG | WEIGHT: 215 LBS | OXYGEN SATURATION: 97 % | HEIGHT: 70 IN

## 2025-03-17 DIAGNOSIS — I10 ESSENTIAL HYPERTENSION: Primary | Chronic | ICD-10-CM

## 2025-03-17 DIAGNOSIS — Z79.899 MEDICATION MANAGEMENT: ICD-10-CM

## 2025-03-17 DIAGNOSIS — K21.9 GASTROESOPHAGEAL REFLUX DISEASE WITHOUT ESOPHAGITIS: Chronic | ICD-10-CM

## 2025-03-17 DIAGNOSIS — F41.9 ANXIETY: Chronic | ICD-10-CM

## 2025-03-17 PROCEDURE — 99214 OFFICE O/P EST MOD 30 MIN: CPT | Performed by: INTERNAL MEDICINE

## 2025-03-17 NOTE — PROGRESS NOTES
Subjective   Primitivo Reeves is a 34 y.o. male.     Chief Complaint   Patient presents with    Hypertension         Hypertension  This is a chronic problem. The current episode started more than 1 year ago. The problem is unchanged. Associated symptoms include anxiety. Pertinent negatives include no chest pain, palpitations, peripheral edema or shortness of breath.   Anxiety  Presents for follow-up visit. Patient reports no chest pain, palpitations or shortness of breath.            The following portions of the patient's history were reviewed and updated as appropriate: allergies, current medications, past social history and problem list.    Outpatient Medications Marked as Taking for the 3/17/25 encounter (Office Visit) with Chicho Mckeon MD   Medication Sig Dispense Refill    atenolol (TENORMIN) 50 MG tablet TAKE 1 TABLET BY MOUTH DAILY 90 tablet 1    Fluticasone Furoate-Vilanterol 100-25 MCG/ACT aerosol powder  Inhale 1 puff Daily. 60 each 1    olmesartan (BENICAR) 5 MG tablet Take 1 tablet by mouth Daily. 90 tablet 1       Review of Systems   Respiratory:  Negative for shortness of breath and wheezing.    Cardiovascular:  Negative for chest pain, palpitations and leg swelling.   Gastrointestinal:  Negative for constipation and diarrhea.       Objective   Vitals:    03/17/25 1338   BP: 130/74   Pulse: 56   Resp: 18   Temp: 98.1 °F (36.7 °C)   SpO2: 97%          03/17/25  1338   Weight: 97.5 kg (215 lb)    [unfilled]  Body mass index is 30.85 kg/m².      Physical Exam  Constitutional:       Appearance: Normal appearance. He is well-developed.   Neck:      Thyroid: No thyromegaly.   Cardiovascular:      Rate and Rhythm: Normal rate and regular rhythm.      Heart sounds: Normal heart sounds. No murmur heard.     No gallop.   Pulmonary:      Effort: Pulmonary effort is normal. No respiratory distress.      Breath sounds: Normal breath sounds. No wheezing or rales.   Abdominal:      General: Bowel sounds  are normal.      Palpations: Abdomen is soft. There is no mass.      Tenderness: There is no abdominal tenderness. There is no guarding.   Neurological:      Mental Status: He is alert.             Problems Addressed this Visit          Cardiac and Vasculature    Essential hypertension - Primary (Chronic)       Gastrointestinal Abdominal     Gastroesophageal reflux disease without esophagitis (Chronic)       Mental Health    Anxiety (Chronic)     Diagnoses         Codes Comments      Essential hypertension    -  Primary ICD-10-CM: I10  ICD-9-CM: 401.9       Gastroesophageal reflux disease without esophagitis     ICD-10-CM: K21.9  ICD-9-CM: 530.81       Anxiety     ICD-10-CM: F41.9  ICD-9-CM: 300.00           Assessment & Plan   In for recheck of hypertension, anxiety and ADD today March 2025.  Anxiety is much improved since he got off of caffeine.  He is off of ADD medicine.  He's had some significant anxiety and panic in the past.  That's doing better.  Blood pressure is good today and he has been off of Benicar 10 mg daily.  He remains on atenolol 50 mg daily.  That is reviewed today.  He got his annual lab work today March 2025 including CBC, CMP, lipids, UA.  We will get annual preventive exam September 2025.  We'll continue to monitor blood pressure at 6 month intervals.  He is off of alcohol which is fixed a lot of his problems.  He has been using some cognitive behavioral techniques.      The above information was reviewed again today 03/17/25.  It continues to be accurate as reflected above and is unchanged.  History, physical and review of systems all reviewed and are unchanged.  Medications were reviewed today and continue the current dosing.           Dragon disclaimer:   Much of this encounter note is an electronic transcription/translation of spoken language to printed text. The electronic translation of spoken language may permit erroneous, or at times, nonsensical words or phrases to be inadvertently  transcribed; Although I have reviewed the note for such errors, some may still exist.

## 2025-03-18 LAB
ALBUMIN SERPL-MCNC: 4.7 G/DL (ref 4.1–5.1)
ALP SERPL-CCNC: 58 IU/L (ref 44–121)
ALT SERPL-CCNC: 22 IU/L (ref 0–44)
AST SERPL-CCNC: 18 IU/L (ref 0–40)
BASOPHILS # BLD AUTO: 0 X10E3/UL (ref 0–0.2)
BASOPHILS NFR BLD AUTO: 1 %
BILIRUB SERPL-MCNC: 0.8 MG/DL (ref 0–1.2)
BUN SERPL-MCNC: 11 MG/DL (ref 6–20)
BUN/CREAT SERPL: 11 (ref 9–20)
CALCIUM SERPL-MCNC: 9.6 MG/DL (ref 8.7–10.2)
CHLORIDE SERPL-SCNC: 101 MMOL/L (ref 96–106)
CHOLEST SERPL-MCNC: 156 MG/DL (ref 100–199)
CHOLEST/HDLC SERPL: 2.6 RATIO (ref 0–5)
CO2 SERPL-SCNC: 27 MMOL/L (ref 20–29)
CREAT SERPL-MCNC: 0.99 MG/DL (ref 0.76–1.27)
EGFRCR SERPLBLD CKD-EPI 2021: 101 ML/MIN/1.73
EOSINOPHIL # BLD AUTO: 0.1 X10E3/UL (ref 0–0.4)
EOSINOPHIL NFR BLD AUTO: 1 %
ERYTHROCYTE [DISTWIDTH] IN BLOOD BY AUTOMATED COUNT: 12.8 % (ref 11.6–15.4)
GLOBULIN SER CALC-MCNC: 2.5 G/DL (ref 1.5–4.5)
GLUCOSE SERPL-MCNC: 91 MG/DL (ref 70–99)
HCT VFR BLD AUTO: 47.4 % (ref 37.5–51)
HDLC SERPL-MCNC: 60 MG/DL
HGB BLD-MCNC: 15.4 G/DL (ref 13–17.7)
IMM GRANULOCYTES # BLD AUTO: 0 X10E3/UL (ref 0–0.1)
IMM GRANULOCYTES NFR BLD AUTO: 0 %
LDLC SERPL CALC-MCNC: 85 MG/DL (ref 0–99)
LYMPHOCYTES # BLD AUTO: 3.2 X10E3/UL (ref 0.7–3.1)
LYMPHOCYTES NFR BLD AUTO: 42 %
MCH RBC QN AUTO: 29.9 PG (ref 26.6–33)
MCHC RBC AUTO-ENTMCNC: 32.5 G/DL (ref 31.5–35.7)
MCV RBC AUTO: 92 FL (ref 79–97)
MONOCYTES # BLD AUTO: 0.8 X10E3/UL (ref 0.1–0.9)
MONOCYTES NFR BLD AUTO: 10 %
NEUTROPHILS # BLD AUTO: 3.5 X10E3/UL (ref 1.4–7)
NEUTROPHILS NFR BLD AUTO: 46 %
PLATELET # BLD AUTO: 249 X10E3/UL (ref 150–450)
POTASSIUM SERPL-SCNC: 4.4 MMOL/L (ref 3.5–5.2)
PROT SERPL-MCNC: 7.2 G/DL (ref 6–8.5)
RBC # BLD AUTO: 5.15 X10E6/UL (ref 4.14–5.8)
SODIUM SERPL-SCNC: 139 MMOL/L (ref 134–144)
TRIGL SERPL-MCNC: 55 MG/DL (ref 0–149)
VLDLC SERPL CALC-MCNC: 11 MG/DL (ref 5–40)
WBC # BLD AUTO: 7.6 X10E3/UL (ref 3.4–10.8)

## 2025-08-25 RX ORDER — ATENOLOL 50 MG/1
50 TABLET ORAL DAILY
Qty: 90 TABLET | Refills: 1 | Status: SHIPPED | OUTPATIENT
Start: 2025-08-25